# Patient Record
Sex: MALE | Race: WHITE | NOT HISPANIC OR LATINO | Employment: OTHER | URBAN - METROPOLITAN AREA
[De-identification: names, ages, dates, MRNs, and addresses within clinical notes are randomized per-mention and may not be internally consistent; named-entity substitution may affect disease eponyms.]

---

## 2018-05-01 ENCOUNTER — AMBULATORY - HEALTHEAST (OUTPATIENT)
Dept: INTERNAL MEDICINE | Facility: CLINIC | Age: 63
End: 2018-05-01

## 2018-05-01 ENCOUNTER — OFFICE VISIT - HEALTHEAST (OUTPATIENT)
Dept: INTERNAL MEDICINE | Facility: CLINIC | Age: 63
End: 2018-05-01

## 2018-05-01 DIAGNOSIS — F17.200 TOBACCO DEPENDENCE: ICD-10-CM

## 2018-05-01 DIAGNOSIS — I10 ESSENTIAL HYPERTENSION: ICD-10-CM

## 2018-05-01 DIAGNOSIS — R27.0 ATAXIA: ICD-10-CM

## 2018-05-01 DIAGNOSIS — F32.A DEPRESSION: ICD-10-CM

## 2018-05-01 DIAGNOSIS — R01.1 HEART MURMUR: ICD-10-CM

## 2018-05-01 LAB
ALBUMIN SERPL-MCNC: 4.1 G/DL (ref 3.5–5)
ALP SERPL-CCNC: 66 U/L (ref 45–120)
ALT SERPL W P-5'-P-CCNC: 15 U/L (ref 0–45)
ANION GAP SERPL CALCULATED.3IONS-SCNC: 10 MMOL/L (ref 5–18)
AST SERPL W P-5'-P-CCNC: 14 U/L (ref 0–40)
BASOPHILS # BLD AUTO: 0.1 THOU/UL (ref 0–0.2)
BASOPHILS NFR BLD AUTO: 1 % (ref 0–2)
BILIRUB SERPL-MCNC: 0.6 MG/DL (ref 0–1)
BUN SERPL-MCNC: 21 MG/DL (ref 8–22)
C REACTIVE PROTEIN LHE: 0.1 MG/DL (ref 0–0.8)
CALCIUM SERPL-MCNC: 10 MG/DL (ref 8.5–10.5)
CHLORIDE BLD-SCNC: 107 MMOL/L (ref 98–107)
CO2 SERPL-SCNC: 23 MMOL/L (ref 22–31)
CREAT SERPL-MCNC: 0.8 MG/DL (ref 0.7–1.3)
EOSINOPHIL # BLD AUTO: 0.2 THOU/UL (ref 0–0.4)
EOSINOPHIL NFR BLD AUTO: 2 % (ref 0–6)
ERYTHROCYTE [DISTWIDTH] IN BLOOD BY AUTOMATED COUNT: 11.9 % (ref 11–14.5)
GFR SERPL CREATININE-BSD FRML MDRD: >60 ML/MIN/1.73M2
GLUCOSE BLD-MCNC: 83 MG/DL (ref 70–125)
HCT VFR BLD AUTO: 45.2 % (ref 40–54)
HGB BLD-MCNC: 15.7 G/DL (ref 14–18)
LYMPHOCYTES # BLD AUTO: 1.9 THOU/UL (ref 0.8–4.4)
LYMPHOCYTES NFR BLD AUTO: 24 % (ref 20–40)
MAGNESIUM SERPL-MCNC: 2.2 MG/DL (ref 1.8–2.6)
MCH RBC QN AUTO: 31.5 PG (ref 27–34)
MCHC RBC AUTO-ENTMCNC: 34.7 G/DL (ref 32–36)
MCV RBC AUTO: 91 FL (ref 80–100)
MONOCYTES # BLD AUTO: 0.7 THOU/UL (ref 0–0.9)
MONOCYTES NFR BLD AUTO: 9 % (ref 2–10)
NEUTROPHILS # BLD AUTO: 5.1 THOU/UL (ref 2–7.7)
NEUTROPHILS NFR BLD AUTO: 65 % (ref 50–70)
PLATELET # BLD AUTO: 229 THOU/UL (ref 140–440)
PMV BLD AUTO: 7.7 FL (ref 7–10)
POTASSIUM BLD-SCNC: 4.5 MMOL/L (ref 3.5–5)
PROT SERPL-MCNC: 6.7 G/DL (ref 6–8)
RBC # BLD AUTO: 4.98 MILL/UL (ref 4.4–6.2)
SODIUM SERPL-SCNC: 140 MMOL/L (ref 136–145)
T4 FREE SERPL-MCNC: 1.1 NG/DL (ref 0.7–1.8)
TSH SERPL DL<=0.005 MIU/L-ACNC: 1.75 UIU/ML (ref 0.3–5)
VIT B12 SERPL-MCNC: 509 PG/ML (ref 213–816)
WBC: 7.9 THOU/UL (ref 4–11)

## 2018-05-01 ASSESSMENT — MIFFLIN-ST. JEOR: SCORE: 1727

## 2018-05-07 ENCOUNTER — HOSPITAL ENCOUNTER (OUTPATIENT)
Dept: CARDIOLOGY | Facility: CLINIC | Age: 63
Discharge: HOME | End: 2018-05-07
Attending: INTERNAL MEDICINE

## 2018-05-07 DIAGNOSIS — R01.1 HEART MURMUR: ICD-10-CM

## 2018-05-07 LAB
AORTIC ROOT: 4.2 CM
AORTIC VALVE MEAN VELOCITY: 225 CM/S
AV DIMENSIONLESS INDEX VTI: 0.7
AV MEAN GRADIENT: 25 MMHG
AV PEAK GRADIENT: 48.7 MMHG
AV VALVE AREA: 2.8 CM2
AV VELOCITY RATIO: 0.8
BSA FOR ECHO PROCEDURE: 2.15 M2
CV BLOOD PRESSURE: NORMAL MMHG
CV ECHO HEIGHT: 73 IN
CV ECHO WEIGHT: 198 LBS
DOP CALC AO PEAK VEL: 349 CM/S
DOP CALC AO VTI: 65.7 CM
DOP CALC LVOT AREA: 3.8 CM2
DOP CALC LVOT DIAMETER: 2.2 CM
DOP CALC LVOT PEAK VEL: 269 CM/S
DOP CALC LVOT STROKE VOLUME: 182 CM3
DOP CALCLVOT PEAK VEL VTI: 47.9 CM
EJECTION FRACTION: 63 % (ref 55–75)
FRACTIONAL SHORTENING: 37.5 % (ref 28–44)
INTERVENTRICULAR SEPTUM IN END DIASTOLE: 2.3 CM (ref 0.6–1)
IVS/PW RATIO: 1.4
LA AREA 2: 19.2 CM2
LEFT ATRIUM AREA: 19.2 CM2
LEFT ATRIUM LENGTH: 5.9 CM
LEFT VENTRICLE DIASTOLIC VOLUME INDEX: 38.6 CM3/M2 (ref 34–74)
LEFT VENTRICLE DIASTOLIC VOLUME: 83 CM3 (ref 62–150)
LEFT VENTRICLE MASS INDEX: 173.6 G/M2
LEFT VENTRICLE SYSTOLIC VOLUME INDEX: 14.4 CM3/M2 (ref 11–31)
LEFT VENTRICLE SYSTOLIC VOLUME: 31 CM3 (ref 21–61)
LEFT VENTRICULAR INTERNAL DIMENSION IN DIASTOLE: 4 CM (ref 4.2–5.8)
LEFT VENTRICULAR INTERNAL DIMENSION IN SYSTOLE: 2.5 CM (ref 2.5–4)
LEFT VENTRICULAR MASS: 373.3 G
LEFT VENTRICULAR OUTFLOW TRACT MEAN GRADIENT: 15 MMHG
LEFT VENTRICULAR OUTFLOW TRACT MEAN VELOCITY: 180 CM/S
LEFT VENTRICULAR OUTFLOW TRACT PEAK GRADIENT: 29 MMHG
LEFT VENTRICULAR POSTERIOR WALL IN END DIASTOLE: 1.7 CM (ref 0.6–1)
LV STROKE VOLUME INDEX: 84.6 ML/M2
MITRAL VALVE E/A RATIO: 0.8
MV DECELERATION TIME: 222 MS
MV E'TISSUE VEL-MED: 4.46 CM/S
MV MEDIAL E/E' RATIO: 12.5
MV PEAK A VELOCITY: 70.1 CM/S
MV PEAK E VELOCITY: 55.8 CM/S
NUC REST DIASTOLIC VOLUME INDEX: 3168 LBS
NUC REST SYSTOLIC VOLUME INDEX: 73 IN
PR MAX PG: 5 MMHG
PR PEAK VELOCITY: 109 CM/S
TRICUSPID VALVE ANULAR PLANE SYSTOLIC EXCURSION: 2 CM

## 2018-05-07 ASSESSMENT — MIFFLIN-ST. JEOR: SCORE: 1727

## 2018-05-08 ENCOUNTER — COMMUNICATION - HEALTHEAST (OUTPATIENT)
Dept: SCHEDULING | Facility: CLINIC | Age: 63
End: 2018-05-08

## 2018-05-08 ENCOUNTER — AMBULATORY - HEALTHEAST (OUTPATIENT)
Dept: INTERNAL MEDICINE | Facility: CLINIC | Age: 63
End: 2018-05-08

## 2018-05-08 ENCOUNTER — COMMUNICATION - HEALTHEAST (OUTPATIENT)
Dept: INTERNAL MEDICINE | Facility: CLINIC | Age: 63
End: 2018-05-08

## 2018-05-08 DIAGNOSIS — I42.9 CARDIOMYOPATHY (H): ICD-10-CM

## 2018-05-08 DIAGNOSIS — R27.0 ATAXIA: ICD-10-CM

## 2018-05-14 ENCOUNTER — COMMUNICATION - HEALTHEAST (OUTPATIENT)
Dept: INTERNAL MEDICINE | Facility: CLINIC | Age: 63
End: 2018-05-14

## 2018-05-14 ENCOUNTER — OFFICE VISIT - HEALTHEAST (OUTPATIENT)
Dept: INTERNAL MEDICINE | Facility: CLINIC | Age: 63
End: 2018-05-14

## 2018-05-14 DIAGNOSIS — M47.812 CERVICAL SPONDYLOSIS: ICD-10-CM

## 2018-05-14 DIAGNOSIS — I42.2 CARDIOMYOPATHY, HYPERTROPHIC (H): ICD-10-CM

## 2018-05-14 DIAGNOSIS — M47.816 LUMBAR SPONDYLOSIS: ICD-10-CM

## 2018-05-14 DIAGNOSIS — R10.9 ABDOMINAL PAIN: ICD-10-CM

## 2018-05-15 ENCOUNTER — OFFICE VISIT - HEALTHEAST (OUTPATIENT)
Dept: CARDIOLOGY | Facility: CLINIC | Age: 63
End: 2018-05-15

## 2018-05-15 ENCOUNTER — HOSPITAL ENCOUNTER (OUTPATIENT)
Dept: CT IMAGING | Facility: CLINIC | Age: 63
Discharge: HOME | End: 2018-05-15
Attending: INTERNAL MEDICINE

## 2018-05-15 DIAGNOSIS — I51.89 DYNAMIC LEFT VENTRICULAR OUTFLOW OBSTRUCTION: ICD-10-CM

## 2018-05-15 DIAGNOSIS — I10 ESSENTIAL HYPERTENSION: ICD-10-CM

## 2018-05-15 DIAGNOSIS — Z72.0 TOBACCO ABUSE: ICD-10-CM

## 2018-05-15 DIAGNOSIS — R55 SYNCOPE, UNSPECIFIED SYNCOPE TYPE: ICD-10-CM

## 2018-05-15 DIAGNOSIS — R10.9 ABDOMINAL PAIN: ICD-10-CM

## 2018-05-15 DIAGNOSIS — I42.9 CARDIOMYOPATHY (H): ICD-10-CM

## 2018-05-15 LAB
ATRIAL RATE - MUSE: 76 BPM
DIASTOLIC BLOOD PRESSURE - MUSE: NORMAL MMHG
INTERPRETATION ECG - MUSE: NORMAL
P AXIS - MUSE: 17 DEGREES
PR INTERVAL - MUSE: 142 MS
QRS DURATION - MUSE: 90 MS
QT - MUSE: 402 MS
QTC - MUSE: 452 MS
R AXIS - MUSE: 16 DEGREES
SYSTOLIC BLOOD PRESSURE - MUSE: NORMAL MMHG
T AXIS - MUSE: 94 DEGREES
VENTRICULAR RATE- MUSE: 76 BPM

## 2018-05-15 ASSESSMENT — MIFFLIN-ST. JEOR: SCORE: 1722.47

## 2018-05-17 ENCOUNTER — COMMUNICATION - HEALTHEAST (OUTPATIENT)
Dept: CARDIOLOGY | Facility: CLINIC | Age: 63
End: 2018-05-17

## 2018-05-17 DIAGNOSIS — I10 HTN (HYPERTENSION): ICD-10-CM

## 2018-05-21 ENCOUNTER — COMMUNICATION - HEALTHEAST (OUTPATIENT)
Dept: SCHEDULING | Facility: CLINIC | Age: 63
End: 2018-05-21

## 2018-05-23 ENCOUNTER — MEDICAL CORRESPONDENCE (OUTPATIENT)
Dept: HEALTH INFORMATION MANAGEMENT | Facility: CLINIC | Age: 63
End: 2018-05-23

## 2018-05-30 ENCOUNTER — OFFICE VISIT - HEALTHEAST (OUTPATIENT)
Dept: INTERNAL MEDICINE | Facility: CLINIC | Age: 63
End: 2018-05-30

## 2018-05-30 DIAGNOSIS — G31.9 CEREBELLAR DEGENERATION (H): ICD-10-CM

## 2018-05-30 DIAGNOSIS — I42.2 HYPERTROPHIC CARDIOMYOPATHY (H): ICD-10-CM

## 2018-05-30 DIAGNOSIS — I10 ESSENTIAL HYPERTENSION: ICD-10-CM

## 2018-05-30 DIAGNOSIS — R27.0 ATAXIA: ICD-10-CM

## 2018-06-05 ENCOUNTER — TRANSFERRED RECORDS (OUTPATIENT)
Dept: HEALTH INFORMATION MANAGEMENT | Facility: CLINIC | Age: 63
End: 2018-06-05

## 2018-06-05 ENCOUNTER — RECORDS - HEALTHEAST (OUTPATIENT)
Dept: ADMINISTRATIVE | Facility: OTHER | Age: 63
End: 2018-06-05

## 2018-06-06 ENCOUNTER — HOSPITAL ENCOUNTER (OUTPATIENT)
Dept: MRI IMAGING | Facility: HOSPITAL | Age: 63
Discharge: HOME | End: 2018-06-06
Attending: INTERNAL MEDICINE

## 2018-06-06 DIAGNOSIS — I51.89 DYNAMIC LEFT VENTRICULAR OUTFLOW OBSTRUCTION: ICD-10-CM

## 2018-06-06 DIAGNOSIS — I42.9 CARDIOMYOPATHY (H): ICD-10-CM

## 2018-06-06 DIAGNOSIS — R55 SYNCOPE, UNSPECIFIED SYNCOPE TYPE: ICD-10-CM

## 2018-06-06 LAB
CREAT BLD-MCNC: 0.9 MG/DL
CREAT BLD-MCNC: 0.9 MG/DL (ref 0.7–1.3)
POC GFR AMER AF HE - HISTORICAL: >60 ML/MIN/1.73M2
POC GFR NON AMER AF HE - HISTORICAL: >60 ML/MIN/1.73M2

## 2018-06-08 LAB
CCTA EJECTION FRACTION: 93 %
CCTA INTERVENTRICULAR SETPUM: 1.9 CM (ref 0.6–1.1)
CCTA LEFT INTERNAL DIMENSION IN SYSTOLE: 2 CM (ref 2.1–4)
CCTA LEFT VENTRICULAR INTERNAL DIMENSION IN DIASTOLE: 4.4 CM (ref 3.5–6)
CCTA LEFT VENTRICULAR MASS: 294.95 G
CCTA POSTERIOR WALL: 1.3 CM (ref 0.6–1.1)
MR CARDIAC LEFT VENTRIAL CARDIAC INDEX: 3.2 L/MIN/M2 (ref 1.7–4.2)
MR CARDIAC LEFT VENTRICAL CARDIAC OUTPUT: 6.9 L/MIN (ref 2.8–8.8)
MR CARDIAC LEFT VENTRICULAR DIASTOLIC VOLUME INDEX: 76.09 ML/M2 (ref 47–92)
MR CARDIAC LEFT VENTRICULAR MASS INDEX: 128.22 G/M2 (ref 70–113)
MR CARDIAC LEFT VENTRICULAR MASS: 273 G (ref 118–238)
MR CARDIAC LEFT VENTRICULAR STROKE VOLUME INDEX: 62.94 ML/M2 (ref 32–62)
MR CARDIAC LEFT VENTRICULAR SYSTOLIC VOLUME INDEX: 13.15 ML/M2 (ref 13–30)
MR EJECTION FRACTION: 82.72 %
MR HEIGHT: 1.85 M
MR LEFT VENTRICULAR DYSTOLIC VOLUMEN: 162 ML (ref 77–195)
MR LEFT VENTRICULAR STROKE VOLUMEN: 134 ML (ref 51–133)
MR LEFT VENTRICULAR SYSTOLIC VOLUME: 28 ML (ref 19–72)
MR WEIGHT: 88.5 KG

## 2018-06-18 ENCOUNTER — HOSPITAL ENCOUNTER (OUTPATIENT)
Dept: PHYSICAL THERAPY | Facility: CLINIC | Age: 63
Setting detail: THERAPIES SERIES
End: 2018-06-18
Attending: INTERNAL MEDICINE
Payer: COMMERCIAL

## 2018-06-18 PROCEDURE — 97162 PT EVAL MOD COMPLEX 30 MIN: CPT | Mod: GP | Performed by: PHYSICAL THERAPIST

## 2018-06-18 PROCEDURE — 40000719 ZZHC STATISTIC PT DEPARTMENT NEURO VISIT: Performed by: PHYSICAL THERAPIST

## 2018-06-18 PROCEDURE — 97112 NEUROMUSCULAR REEDUCATION: CPT | Mod: GP | Performed by: PHYSICAL THERAPIST

## 2018-06-18 PROCEDURE — 97116 GAIT TRAINING THERAPY: CPT | Mod: GP | Performed by: PHYSICAL THERAPIST

## 2018-06-18 ASSESSMENT — 6 MINUTE WALK TEST (6MWT): TOTAL DISTANCE WALKED (FT): 1100

## 2018-06-18 NOTE — PROGRESS NOTES
06/18/18 1300   Quick Adds   Type of Visit Initial OP PT Evaluation   General Information   Start of Care Date 06/18/18   Referring Physician Dr. Carroll Servin   Orders Evaluate and Treat as Indicated   Additional Orders Gait and balance evaluation regarding ataxia   Order Date 05/23/18   Medical Diagnosis Ataxia   Onset of illness/injury or Date of Surgery 05/23/18   Precautions/Limitations fall precautions   Surgical/Medical history reviewed Yes   Pertinent history of current problem (include personal factors and/or comorbidities that impact the POC) Pt reports he's a recovering alcoholic and has been sober for the past 6 weeks. Started to develop ataxia and difficulties with gait, tripping and falling or nearly falling. Has baseline tinnitis for past 3--4 years and also headaches and 'gut aches' that's he hopeful will improve with his sobriety.   Pertinent Visual History  wears readers   Prior level of function comment independent, more cautious and limiting social activites due to gait disturbances   Current Community Support Family/friend caregiver   Patient role/Employment history Retired  ()   [facility]   Home/Community Accessibility Comments flight of steps down to lower level where basement is located, doesn't carry objects while negotiating stairs.   Current Assistive Devices Standard Cane   Assistive Devices Comments starting using SPC 3 weeks ago   Patient/Family Goals Statement Goal is to be on 'Dancing with the Stars'   Fall Risk Screen   Fall screen completed by PT   Have you fallen 2 or more times in the past year? Yes   Have you fallen and had an injury in the past year? No   Timed Up and Go score (seconds) 13.2s   Is patient a fall risk? Yes   Pain   Patient currently in pain No   Cognitive Status Examination   Orientation orientation to person, place and time   Level of Consciousness alert   Follows Commands and Answers Questions 100% of the time    Personal Safety and Judgment intact   Memory intact   Strength   Strength Comments Hamstring and hip abductors 4-/5, left ankle DF 4/5.   Bed Mobility   Bed Mobility Comments independent   Transfer Skills   Transfer Comments able to rise without UE assist   Gait   Gait Comments ambulates with SPC, increased knee flexion bilaterally to assist with foot clearance, left foot lands in increased supination during stance phase, decreased trunk rotation, increased DF bilatreally to assist with foot clearance. Ambulation appears like sensation is improved with over compensation to clear feet.   Gait Special Tests Six Minute Walk Test   Feet 1100 Feet   Comments carries cane; see gait analysis   Balance Special Tests Modified CTSIB Conditions   Condition 1, seconds 30 Seconds   Condition 2, seconds 8 Seconds   Condition 4, seconds 15 Seconds   Condition 5, seconds 2 Seconds   Balance Special Tests Sit to Stand Reps in 30 Seconds   Reps in 30 seconds 11   Height 16   Comments without UE assist   Sensory Examination   Sensory Perception Comments a band of tingling around metatarsal heads bilaterally, stays on dorsum of foot. Pt had EMG study but not able to see results.   Coordination   Coordination Comments impaired, single foot taps, alternating L/R and left side signifcantly delayed compared to right. Unable to sync hand tap and foot tap. Heel to shin is abnormal   Planned Therapy Interventions   Planned Therapy Interventions balance training;gait training;neuromuscular re-education;strengthening;transfer training   Clinical Impression   Criteria for Skilled Therapeutic Interventions Met yes, treatment indicated   PT Diagnosis difficutly with gait   Influenced by the following impairments tripping/falling/near misses, fall history, ataxia and coordination impairment, history of ETOH abuse.   Functional limitations due to impairments difficulty with gait, transfers, fall risk, unsteady with first steps, difficulty with  transitional movements and positions   Clinical Presentation Evolving/Changing   Clinical Presentation Rationale fall history/risk   Clinical Decision Making (Complexity) Moderate complexity   Therapy Frequency 2 times/Week   Predicted Duration of Therapy Intervention (days/wks) 60 days   Risk & Benefits of therapy have been explained Yes   Patient, Family & other staff in agreement with plan of care Yes   Education Assessment   Preferred Learning Style Demonstration   Barriers to Learning No barriers   GOALS   PT Eval Goals 1;2;3   Goal 1   Goal Identifier 6MWT   Goal Description 1. Patient will improve distance ambulated on the 6MWT by greater than 150 feet to indicate improved gait mechanics and safety when turning.   Target Date 08/17/18   Goal 2   Goal Identifier BALANCE   Goal Description 2. Pt will improve static balance with eyes closed to improve safety when walking at night, in low light, or balance in the shower by improving conditions 2,4 and the modified CTSIB   Target Date 08/17/18   Goal 3   Goal Identifier GAIT MECHANICS   Goal Description 3. Pt will ambulate with equal step length and arm swing without an assistive device in order to safely negotiate all community surfaces   Target Date 08/17/18   Total Evaluation Time   Total Evaluation Time (Minutes) 20

## 2018-06-19 ENCOUNTER — OFFICE VISIT - HEALTHEAST (OUTPATIENT)
Dept: CARDIOLOGY | Facility: CLINIC | Age: 63
End: 2018-06-19

## 2018-06-19 DIAGNOSIS — Z72.0 TOBACCO ABUSE: ICD-10-CM

## 2018-06-19 DIAGNOSIS — I10 ESSENTIAL HYPERTENSION: ICD-10-CM

## 2018-06-19 DIAGNOSIS — I42.2 HYPERTROPHIC CARDIOMYOPATHY (H): ICD-10-CM

## 2018-06-19 ASSESSMENT — MIFFLIN-ST. JEOR: SCORE: 1699.79

## 2018-06-23 ENCOUNTER — RECORDS - HEALTHEAST (OUTPATIENT)
Dept: ADMINISTRATIVE | Facility: OTHER | Age: 63
End: 2018-06-23

## 2018-06-25 ENCOUNTER — HOSPITAL ENCOUNTER (OUTPATIENT)
Dept: PHYSICAL THERAPY | Facility: CLINIC | Age: 63
Setting detail: THERAPIES SERIES
End: 2018-06-25
Attending: PSYCHIATRY & NEUROLOGY
Payer: COMMERCIAL

## 2018-06-25 PROCEDURE — 40000719 ZZHC STATISTIC PT DEPARTMENT NEURO VISIT: Performed by: PHYSICAL THERAPIST

## 2018-06-25 PROCEDURE — 97112 NEUROMUSCULAR REEDUCATION: CPT | Mod: GP | Performed by: PHYSICAL THERAPIST

## 2018-06-25 PROCEDURE — 97116 GAIT TRAINING THERAPY: CPT | Mod: GP | Performed by: PHYSICAL THERAPIST

## 2018-06-27 ENCOUNTER — COMMUNICATION - HEALTHEAST (OUTPATIENT)
Dept: INTERNAL MEDICINE | Facility: CLINIC | Age: 63
End: 2018-06-27

## 2018-06-28 ENCOUNTER — HOSPITAL ENCOUNTER (OUTPATIENT)
Dept: PHYSICAL THERAPY | Facility: CLINIC | Age: 63
Setting detail: THERAPIES SERIES
End: 2018-06-28
Attending: PSYCHIATRY & NEUROLOGY
Payer: COMMERCIAL

## 2018-06-28 PROCEDURE — 40000719 ZZHC STATISTIC PT DEPARTMENT NEURO VISIT: Performed by: PHYSICAL THERAPIST

## 2018-06-28 PROCEDURE — 97116 GAIT TRAINING THERAPY: CPT | Mod: GP | Performed by: PHYSICAL THERAPIST

## 2018-06-28 PROCEDURE — 97112 NEUROMUSCULAR REEDUCATION: CPT | Mod: GP | Performed by: PHYSICAL THERAPIST

## 2018-07-05 ENCOUNTER — HOSPITAL ENCOUNTER (OUTPATIENT)
Dept: PHYSICAL THERAPY | Facility: CLINIC | Age: 63
Setting detail: THERAPIES SERIES
End: 2018-07-05
Attending: PSYCHIATRY & NEUROLOGY
Payer: COMMERCIAL

## 2018-07-05 PROCEDURE — 97112 NEUROMUSCULAR REEDUCATION: CPT | Mod: GP | Performed by: PHYSICAL THERAPIST

## 2018-07-05 PROCEDURE — 40000719 ZZHC STATISTIC PT DEPARTMENT NEURO VISIT: Performed by: PHYSICAL THERAPIST

## 2018-07-05 PROCEDURE — 97116 GAIT TRAINING THERAPY: CPT | Mod: GP | Performed by: PHYSICAL THERAPIST

## 2018-07-12 ENCOUNTER — HOSPITAL ENCOUNTER (OUTPATIENT)
Dept: PHYSICAL THERAPY | Facility: CLINIC | Age: 63
Setting detail: THERAPIES SERIES
End: 2018-07-12
Attending: PSYCHIATRY & NEUROLOGY
Payer: COMMERCIAL

## 2018-07-12 PROCEDURE — 40000719 ZZHC STATISTIC PT DEPARTMENT NEURO VISIT: Performed by: PHYSICAL THERAPIST

## 2018-07-12 PROCEDURE — 97112 NEUROMUSCULAR REEDUCATION: CPT | Mod: GP | Performed by: PHYSICAL THERAPIST

## 2018-07-12 PROCEDURE — 97110 THERAPEUTIC EXERCISES: CPT | Mod: GP | Performed by: PHYSICAL THERAPIST

## 2018-07-12 PROCEDURE — 97116 GAIT TRAINING THERAPY: CPT | Mod: GP | Performed by: PHYSICAL THERAPIST

## 2018-07-16 ENCOUNTER — HOSPITAL ENCOUNTER (OUTPATIENT)
Dept: PHYSICAL THERAPY | Facility: CLINIC | Age: 63
Setting detail: THERAPIES SERIES
End: 2018-07-16
Attending: PSYCHIATRY & NEUROLOGY
Payer: COMMERCIAL

## 2018-07-16 PROCEDURE — 97110 THERAPEUTIC EXERCISES: CPT | Mod: GP | Performed by: PHYSICAL THERAPIST

## 2018-07-16 PROCEDURE — 40000719 ZZHC STATISTIC PT DEPARTMENT NEURO VISIT: Performed by: PHYSICAL THERAPIST

## 2018-07-16 PROCEDURE — 97116 GAIT TRAINING THERAPY: CPT | Mod: GP | Performed by: PHYSICAL THERAPIST

## 2018-07-16 PROCEDURE — 97112 NEUROMUSCULAR REEDUCATION: CPT | Mod: GP | Performed by: PHYSICAL THERAPIST

## 2018-07-17 ENCOUNTER — RECORDS - HEALTHEAST (OUTPATIENT)
Dept: ADMINISTRATIVE | Facility: OTHER | Age: 63
End: 2018-07-17

## 2018-07-17 ENCOUNTER — TRANSFERRED RECORDS (OUTPATIENT)
Dept: HEALTH INFORMATION MANAGEMENT | Facility: CLINIC | Age: 63
End: 2018-07-17

## 2018-07-19 ENCOUNTER — HOSPITAL ENCOUNTER (OUTPATIENT)
Dept: PHYSICAL THERAPY | Facility: CLINIC | Age: 63
Setting detail: THERAPIES SERIES
End: 2018-07-19
Attending: PSYCHIATRY & NEUROLOGY
Payer: COMMERCIAL

## 2018-07-19 PROCEDURE — 97112 NEUROMUSCULAR REEDUCATION: CPT | Mod: GP | Performed by: PHYSICAL THERAPIST

## 2018-07-19 PROCEDURE — 40000719 ZZHC STATISTIC PT DEPARTMENT NEURO VISIT: Performed by: PHYSICAL THERAPIST

## 2018-07-19 PROCEDURE — 97116 GAIT TRAINING THERAPY: CPT | Mod: GP | Performed by: PHYSICAL THERAPIST

## 2018-07-20 ENCOUNTER — TRANSFERRED RECORDS (OUTPATIENT)
Dept: HEALTH INFORMATION MANAGEMENT | Facility: CLINIC | Age: 63
End: 2018-07-20

## 2018-07-20 ENCOUNTER — RECORDS - HEALTHEAST (OUTPATIENT)
Dept: ADMINISTRATIVE | Facility: OTHER | Age: 63
End: 2018-07-20

## 2018-07-26 ENCOUNTER — HOSPITAL ENCOUNTER (OUTPATIENT)
Dept: PHYSICAL THERAPY | Facility: CLINIC | Age: 63
Setting detail: THERAPIES SERIES
End: 2018-07-26
Attending: PSYCHIATRY & NEUROLOGY
Payer: COMMERCIAL

## 2018-07-26 PROCEDURE — 97112 NEUROMUSCULAR REEDUCATION: CPT | Mod: GP | Performed by: PHYSICAL THERAPIST

## 2018-07-26 PROCEDURE — 97116 GAIT TRAINING THERAPY: CPT | Mod: GP | Performed by: PHYSICAL THERAPIST

## 2018-07-26 PROCEDURE — 40000719 ZZHC STATISTIC PT DEPARTMENT NEURO VISIT: Performed by: PHYSICAL THERAPIST

## 2018-08-02 ENCOUNTER — COMMUNICATION - HEALTHEAST (OUTPATIENT)
Dept: INTERNAL MEDICINE | Facility: CLINIC | Age: 63
End: 2018-08-02

## 2018-08-02 ENCOUNTER — HOSPITAL ENCOUNTER (OUTPATIENT)
Dept: PHYSICAL THERAPY | Facility: CLINIC | Age: 63
Setting detail: THERAPIES SERIES
End: 2018-08-02
Attending: PSYCHIATRY & NEUROLOGY
Payer: COMMERCIAL

## 2018-08-02 DIAGNOSIS — F32.A DEPRESSION: ICD-10-CM

## 2018-08-02 PROCEDURE — 40000719 ZZHC STATISTIC PT DEPARTMENT NEURO VISIT: Performed by: PHYSICAL THERAPIST

## 2018-08-02 PROCEDURE — 97112 NEUROMUSCULAR REEDUCATION: CPT | Mod: GP | Performed by: PHYSICAL THERAPIST

## 2018-08-16 ENCOUNTER — HOSPITAL ENCOUNTER (OUTPATIENT)
Dept: PHYSICAL THERAPY | Facility: CLINIC | Age: 63
Setting detail: THERAPIES SERIES
End: 2018-08-16
Attending: PSYCHIATRY & NEUROLOGY
Payer: COMMERCIAL

## 2018-08-16 PROCEDURE — 40000719 ZZHC STATISTIC PT DEPARTMENT NEURO VISIT: Performed by: PHYSICAL THERAPIST

## 2018-08-16 PROCEDURE — 97112 NEUROMUSCULAR REEDUCATION: CPT | Mod: GP | Performed by: PHYSICAL THERAPIST

## 2018-08-16 PROCEDURE — 97116 GAIT TRAINING THERAPY: CPT | Mod: GP | Performed by: PHYSICAL THERAPIST

## 2018-08-16 PROCEDURE — 97110 THERAPEUTIC EXERCISES: CPT | Mod: GP | Performed by: PHYSICAL THERAPIST

## 2018-09-05 ENCOUNTER — COMMUNICATION - HEALTHEAST (OUTPATIENT)
Dept: INTERNAL MEDICINE | Facility: CLINIC | Age: 63
End: 2018-09-05

## 2018-09-12 ENCOUNTER — OFFICE VISIT - HEALTHEAST (OUTPATIENT)
Dept: INTERNAL MEDICINE | Facility: CLINIC | Age: 63
End: 2018-09-12

## 2018-09-12 DIAGNOSIS — I10 ESSENTIAL HYPERTENSION: ICD-10-CM

## 2018-09-12 DIAGNOSIS — F32.A DEPRESSION, UNSPECIFIED DEPRESSION TYPE: ICD-10-CM

## 2018-09-12 DIAGNOSIS — Z12.5 SCREENING FOR PROSTATE CANCER: ICD-10-CM

## 2018-09-12 DIAGNOSIS — I42.2 HYPERTROPHIC CARDIOMYOPATHY (H): ICD-10-CM

## 2018-09-12 DIAGNOSIS — G31.9 CEREBELLAR DEGENERATION (H): ICD-10-CM

## 2018-09-12 LAB — PSA SERPL-MCNC: 1.3 NG/ML (ref 0–4.5)

## 2018-09-12 ASSESSMENT — MIFFLIN-ST. JEOR: SCORE: 1755.58

## 2018-09-14 ENCOUNTER — COMMUNICATION - HEALTHEAST (OUTPATIENT)
Dept: INTERNAL MEDICINE | Facility: CLINIC | Age: 63
End: 2018-09-14

## 2018-09-18 ENCOUNTER — COMMUNICATION - HEALTHEAST (OUTPATIENT)
Dept: INTERNAL MEDICINE | Facility: CLINIC | Age: 63
End: 2018-09-18

## 2018-09-18 DIAGNOSIS — Z23 NEED FOR SHINGLES VACCINE: ICD-10-CM

## 2019-01-22 ENCOUNTER — RECORDS - HEALTHEAST (OUTPATIENT)
Dept: ADMINISTRATIVE | Facility: OTHER | Age: 64
End: 2019-01-22

## 2019-05-08 ENCOUNTER — COMMUNICATION - HEALTHEAST (OUTPATIENT)
Dept: ADMINISTRATIVE | Facility: CLINIC | Age: 64
End: 2019-05-08

## 2019-06-11 ENCOUNTER — COMMUNICATION - HEALTHEAST (OUTPATIENT)
Dept: ADMINISTRATIVE | Facility: CLINIC | Age: 64
End: 2019-06-11

## 2019-06-17 ENCOUNTER — AMBULATORY - HEALTHEAST (OUTPATIENT)
Dept: CARDIOLOGY | Facility: CLINIC | Age: 64
End: 2019-06-17

## 2019-06-17 DIAGNOSIS — I42.2 HYPERTROPHIC CARDIOMYOPATHY (H): ICD-10-CM

## 2019-06-24 ENCOUNTER — HOSPITAL ENCOUNTER (OUTPATIENT)
Dept: CARDIOLOGY | Facility: CLINIC | Age: 64
Discharge: HOME | End: 2019-06-24
Attending: INTERNAL MEDICINE

## 2019-06-24 DIAGNOSIS — I42.2 HYPERTROPHIC CARDIOMYOPATHY (H): ICD-10-CM

## 2019-06-24 LAB
AORTIC ROOT: 4.4 CM
AORTIC VALVE MEAN VELOCITY: 243 CM/S
AR DECEL SLOPE: 1890 MM/S2
AR PEAK VELOCITY: 307 CM/S
ASCENDING AORTA: 4.6 CM
AV DIMENSIONLESS INDEX VTI: 0.7
AV MEAN GRADIENT: 29 MMHG
AV PEAK GRADIENT: 50.7 MMHG
AV REGURGITANT PEAK GRADIENT: 37.7 MMHG
AV REGURGITATION PRESSURE HALF TIME: 486 MS
AV VALVE AREA: 3 CM2
AV VELOCITY RATIO: 0.7
BSA FOR ECHO PROCEDURE: 2.18 M2
CV BLOOD PRESSURE: ABNORMAL MMHG
CV ECHO HEIGHT: 73 IN
CV ECHO WEIGHT: 204 LBS
DOP CALC AO PEAK VEL: 356 CM/S
DOP CALC AO VTI: 82.1 CM
DOP CALC LVOT AREA: 4.52 CM2
DOP CALC LVOT DIAMETER: 2.4 CM
DOP CALC LVOT PEAK VEL: 242 CM/S
DOP CALC LVOT STROKE VOLUME: 248.7 CM3
DOP CALCLVOT PEAK VEL VTI: 55 CM
EJECTION FRACTION: 66 % (ref 55–75)
INTERVENTRICULAR SEPTUM IN END DIASTOLE: 1.9 CM (ref 0.6–1)
IVS/PW RATIO: 1.9
LA AREA 1: 31.1 CM2
LA AREA 2: 31.2 CM2
LEFT ATRIUM LENGTH: 6.97 CM
LEFT ATRIUM VOLUME INDEX: 54.3 ML/M2
LEFT ATRIUM VOLUME: 118.3 ML
LEFT VENTRICLE CARDIAC INDEX: 6 L/MIN/M2
LEFT VENTRICLE CARDIAC OUTPUT: 13.2 L/MIN
LEFT VENTRICLE DIASTOLIC VOLUME INDEX: 28.4 CM3/M2 (ref 34–74)
LEFT VENTRICLE DIASTOLIC VOLUME: 62 CM3 (ref 62–150)
LEFT VENTRICLE HEART RATE: 53 BPM
LEFT VENTRICLE MASS INDEX: 153.9 G/M2
LEFT VENTRICLE SYSTOLIC VOLUME INDEX: 9.6 CM3/M2 (ref 11–31)
LEFT VENTRICLE SYSTOLIC VOLUME: 21 CM3 (ref 21–61)
LEFT VENTRICULAR INTERNAL DIMENSION IN DIASTOLE: 5.3 CM (ref 4.2–5.8)
LEFT VENTRICULAR MASS: 335.5 G
LEFT VENTRICULAR OUTFLOW TRACT MEAN GRADIENT: 13 MMHG
LEFT VENTRICULAR OUTFLOW TRACT MEAN VELOCITY: 165 CM/S
LEFT VENTRICULAR OUTFLOW TRACT PEAK GRADIENT: 23 MMHG
LEFT VENTRICULAR POSTERIOR WALL IN END DIASTOLE: 1 CM (ref 0.6–1)
LV STROKE VOLUME INDEX: 114.1 ML/M2
MITRAL REGURGITANT VELOCITY TIME INTEGRAL: 180 CM
MITRAL VALVE E/A RATIO: 0.7
MR MEAN GRADIENT: 148 MMHG
MR MEAN VELOCITY: 558 CM/S
MR PEAK GRADIENT: 239.6 MMHG
MV AVERAGE E/E' RATIO: 10.6 CM/S
MV DECELERATION TIME: 363 MS
MV E'TISSUE VEL-LAT: 6.82 CM/S
MV E'TISSUE VEL-MED: 5.46 CM/S
MV LATERAL E/E' RATIO: 9.5
MV MEDIAL E/E' RATIO: 11.9
MV PEAK A VELOCITY: 94.6 CM/S
MV PEAK E VELOCITY: 65.1 CM/S
NUC REST DIASTOLIC VOLUME INDEX: 3264 LBS
NUC REST SYSTOLIC VOLUME INDEX: 73 IN
PISA MR PEAK VEL: 774 CM/S
TRICUSPID VALVE ANULAR PLANE SYSTOLIC EXCURSION: 1.3 CM

## 2019-06-24 ASSESSMENT — MIFFLIN-ST. JEOR: SCORE: 1754.22

## 2019-06-25 ENCOUNTER — AMBULATORY - HEALTHEAST (OUTPATIENT)
Dept: CARDIOLOGY | Facility: CLINIC | Age: 64
End: 2019-06-25

## 2019-06-25 ENCOUNTER — COMMUNICATION - HEALTHEAST (OUTPATIENT)
Dept: CARDIOLOGY | Facility: CLINIC | Age: 64
End: 2019-06-25

## 2019-06-25 DIAGNOSIS — I10 ESSENTIAL HYPERTENSION: ICD-10-CM

## 2019-07-30 ENCOUNTER — COMMUNICATION - HEALTHEAST (OUTPATIENT)
Dept: INTERNAL MEDICINE | Facility: CLINIC | Age: 64
End: 2019-07-30

## 2019-07-30 DIAGNOSIS — F32.A DEPRESSION: ICD-10-CM

## 2019-08-12 ENCOUNTER — COMMUNICATION - HEALTHEAST (OUTPATIENT)
Dept: CARDIOLOGY | Facility: CLINIC | Age: 64
End: 2019-08-12

## 2019-08-12 DIAGNOSIS — I10 ESSENTIAL HYPERTENSION: ICD-10-CM

## 2019-09-24 ENCOUNTER — COMMUNICATION - HEALTHEAST (OUTPATIENT)
Dept: INTERNAL MEDICINE | Facility: CLINIC | Age: 64
End: 2019-09-24

## 2019-09-24 DIAGNOSIS — I10 HYPERTENSION, UNSPECIFIED TYPE: ICD-10-CM

## 2019-09-25 ENCOUNTER — COMMUNICATION - HEALTHEAST (OUTPATIENT)
Dept: CARDIOLOGY | Facility: CLINIC | Age: 64
End: 2019-09-25

## 2019-09-25 DIAGNOSIS — I10 ESSENTIAL HYPERTENSION: ICD-10-CM

## 2019-10-28 ENCOUNTER — COMMUNICATION - HEALTHEAST (OUTPATIENT)
Dept: INTERNAL MEDICINE | Facility: CLINIC | Age: 64
End: 2019-10-28

## 2019-10-28 DIAGNOSIS — F32.A DEPRESSION: ICD-10-CM

## 2019-11-19 ENCOUNTER — COMMUNICATION - HEALTHEAST (OUTPATIENT)
Dept: CARDIOLOGY | Facility: CLINIC | Age: 64
End: 2019-11-19

## 2019-11-19 DIAGNOSIS — I10 ESSENTIAL HYPERTENSION: ICD-10-CM

## 2020-01-01 ENCOUNTER — COMMUNICATION - HEALTHEAST (OUTPATIENT)
Dept: CARDIOLOGY | Facility: CLINIC | Age: 65
End: 2020-01-01

## 2020-01-01 DIAGNOSIS — I10 ESSENTIAL HYPERTENSION: ICD-10-CM

## 2020-01-06 ENCOUNTER — COMMUNICATION - HEALTHEAST (OUTPATIENT)
Dept: CARDIOLOGY | Facility: CLINIC | Age: 65
End: 2020-01-06

## 2020-01-06 DIAGNOSIS — I10 ESSENTIAL HYPERTENSION: ICD-10-CM

## 2020-04-06 ENCOUNTER — COMMUNICATION - HEALTHEAST (OUTPATIENT)
Dept: INTERNAL MEDICINE | Facility: CLINIC | Age: 65
End: 2020-04-06

## 2020-04-06 ENCOUNTER — COMMUNICATION - HEALTHEAST (OUTPATIENT)
Dept: CARDIOLOGY | Facility: CLINIC | Age: 65
End: 2020-04-06

## 2020-04-06 DIAGNOSIS — I10 ESSENTIAL HYPERTENSION: ICD-10-CM

## 2020-09-18 ENCOUNTER — COMMUNICATION - HEALTHEAST (OUTPATIENT)
Dept: INTERNAL MEDICINE | Facility: CLINIC | Age: 65
End: 2020-09-18

## 2020-09-18 DIAGNOSIS — I10 HYPERTENSION, UNSPECIFIED TYPE: ICD-10-CM

## 2020-09-23 ENCOUNTER — COMMUNICATION - HEALTHEAST (OUTPATIENT)
Dept: CARDIOLOGY | Facility: CLINIC | Age: 65
End: 2020-09-23

## 2020-09-23 ENCOUNTER — COMMUNICATION - HEALTHEAST (OUTPATIENT)
Dept: INTERNAL MEDICINE | Facility: CLINIC | Age: 65
End: 2020-09-23

## 2020-09-23 DIAGNOSIS — F32.A DEPRESSION: ICD-10-CM

## 2020-09-23 DIAGNOSIS — I10 ESSENTIAL HYPERTENSION: ICD-10-CM

## 2021-01-11 ENCOUNTER — AMBULATORY - HEALTHEAST (OUTPATIENT)
Dept: INTERNAL MEDICINE | Facility: CLINIC | Age: 66
End: 2021-01-11

## 2021-01-11 ENCOUNTER — COMMUNICATION - HEALTHEAST (OUTPATIENT)
Dept: INTERNAL MEDICINE | Facility: CLINIC | Age: 66
End: 2021-01-11

## 2021-01-11 DIAGNOSIS — I10 ESSENTIAL HYPERTENSION: ICD-10-CM

## 2021-01-13 ENCOUNTER — OFFICE VISIT - HEALTHEAST (OUTPATIENT)
Dept: INTERNAL MEDICINE | Facility: CLINIC | Age: 66
End: 2021-01-13

## 2021-01-13 DIAGNOSIS — I42.2 HYPERTROPHIC CARDIOMYOPATHY (H): ICD-10-CM

## 2021-01-13 DIAGNOSIS — G31.9 CEREBELLAR DEGENERATION (H): ICD-10-CM

## 2021-01-13 DIAGNOSIS — F32.A DEPRESSION, UNSPECIFIED DEPRESSION TYPE: ICD-10-CM

## 2021-01-13 DIAGNOSIS — I10 ESSENTIAL HYPERTENSION: ICD-10-CM

## 2021-01-13 ASSESSMENT — PATIENT HEALTH QUESTIONNAIRE - PHQ9: SUM OF ALL RESPONSES TO PHQ QUESTIONS 1-9: 0

## 2021-03-06 ENCOUNTER — COMMUNICATION - HEALTHEAST (OUTPATIENT)
Dept: SCHEDULING | Facility: CLINIC | Age: 66
End: 2021-03-06

## 2021-05-27 ASSESSMENT — PATIENT HEALTH QUESTIONNAIRE - PHQ9: SUM OF ALL RESPONSES TO PHQ QUESTIONS 1-9: 0

## 2021-05-29 ENCOUNTER — RECORDS - HEALTHEAST (OUTPATIENT)
Dept: ADMINISTRATIVE | Facility: CLINIC | Age: 66
End: 2021-05-29

## 2021-05-30 NOTE — TELEPHONE ENCOUNTER
Refill Approved    Rx renewed per Medication Renewal Policy. Medication was last renewed on 8/2/19.    Malika Ennis, [facility] Triage/Med Refill 7/30/2019     Requested Prescriptions   Pending Prescriptions Disp Refills     escitalopram oxalate (LEXAPRO) 20 MG tablet [Pharmacy Med Name: ESCITALOPRAM 20MG TABLETS] 90 tablet 0     Sig: TAKE 1 TABLET BY MOUTH ONCE DAILY       [facility] Refill Protocol  Passed - 7/30/2019  9:56 AM        Passed - PCP or prescribing provider visit in last year     Last office visit with prescriber/PCP: 9/12/2018 Mason Montero MD OR same dept: 9/12/2018 Mason Montero MD OR same specialty: 9/12/2018 Mason Montero MD  Last physical: Visit date not found Last MTM visit: Visit date not found   Next visit within 3 mo: Visit date not found  Next physical within 3 mo: Visit date not found  Prescriber OR PCP: Mason Montero MD  Last diagnosis associated with med order: 1. Depression  - escitalopram oxalate (LEXAPRO) 20 MG tablet [Pharmacy Med Name: ESCITALOPRAM 20MG TABLETS]; TAKE 1 TABLET BY MOUTH ONCE DAILY  Dispense: 90 tablet; Refill: 0    If protocol passes may refill for 12 months if within 3 months of last provider visit (or a total of 15 months).

## 2021-06-01 VITALS — HEIGHT: 73 IN | WEIGHT: 197 LBS | BODY MASS INDEX: 26.11 KG/M2

## 2021-06-01 VITALS — WEIGHT: 198 LBS | HEIGHT: 73 IN | BODY MASS INDEX: 26.24 KG/M2

## 2021-06-01 VITALS — WEIGHT: 198 LBS | BODY MASS INDEX: 26.24 KG/M2 | HEIGHT: 73 IN

## 2021-06-01 VITALS — HEIGHT: 73 IN | WEIGHT: 192 LBS | BODY MASS INDEX: 25.45 KG/M2

## 2021-06-01 VITALS — BODY MASS INDEX: 25.73 KG/M2 | WEIGHT: 195 LBS

## 2021-06-01 VITALS — WEIGHT: 194.8 LBS | BODY MASS INDEX: 25.7 KG/M2

## 2021-06-01 NOTE — TELEPHONE ENCOUNTER
RN cannot approve Refill Request    RN can NOT refill this medication Protocol failed and NO refill given      Malika Ennis, [facility] Triage/Med Refill 9/25/2019    Requested Prescriptions   Pending Prescriptions Disp Refills     lisinopril (PRINIVIL,ZESTRIL) 10 MG tablet [Pharmacy Med Name: LISINOPRIL 10MG TABLETS] 90 tablet 3     Sig: TAKE 1 TABLET BY MOUTH ONCE DAILY       Ace Inhibitors Refill Protocol Failed - 9/24/2019  7:49 AM        Failed - PCP or prescribing provider visit in past 12 months       Last office visit with prescriber/PCP: 9/12/2018 Mason Montero MD OR same dept: Visit date not found OR same specialty: 9/12/2018 Mason Montero MD  Last physical: Visit date not found Last MTM visit: Visit date not found   Next visit within 3 mo: Visit date not found  Next physical within 3 mo: Visit date not found  Prescriber OR PCP: Mason Montero MD  Last diagnosis associated with med order: There are no diagnoses linked to this encounter.  If protocol passes may refill for 12 months if within 3 months of last provider visit (or a total of 15 months).             Failed - Serum Potassium in past 12 months     No results found for: LN-POTASSIUM          Failed - Serum Creatinine in past 12 months     Creatinine   Date Value Ref Range Status   05/01/2018 0.80 0.70 - 1.30 mg/dL Final             Passed - Blood pressure filed in past 12 months     BP Readings from Last 1 Encounters:   06/24/19 134/78

## 2021-06-02 VITALS — WEIGHT: 204.3 LBS | HEIGHT: 73 IN | BODY MASS INDEX: 27.08 KG/M2

## 2021-06-02 NOTE — TELEPHONE ENCOUNTER
RN cannot approve Refill Request    RN can NOT refill this medication Protocol failed and NO refill given.     Malika Ennis, [facility] Triage/Med Refill 10/28/2019    Requested Prescriptions   Pending Prescriptions Disp Refills     escitalopram oxalate (LEXAPRO) 20 MG tablet [Pharmacy Med Name: ESCITALOPRAM 20MG TABLETS] 90 tablet 3     Sig: TAKE 1 TABLET BY MOUTH ONCE DAILY       [facility] Refill Protocol  Failed - 10/28/2019  8:00 AM        Failed - PCP or prescribing provider visit in last year     Last office visit with prescriber/PCP: 9/12/2018 Mason Montero MD OR same dept: Visit date not found OR same specialty: 9/12/2018 Mason Montero MD  Last physical: Visit date not found Last MTM visit: Visit date not found   Next visit within 3 mo: Visit date not found  Next physical within 3 mo: Visit date not found  Prescriber OR PCP: Mason Montero MD  Last diagnosis associated with med order: 1. Depression  - escitalopram oxalate (LEXAPRO) 20 MG tablet [Pharmacy Med Name: ESCITALOPRAM 20MG TABLETS]; TAKE 1 TABLET BY MOUTH ONCE DAILY  Dispense: 90 tablet; Refill: 0    If protocol passes may refill for 12 months if within 3 months of last provider visit (or a total of 15 months).

## 2021-06-03 VITALS — HEIGHT: 73 IN | BODY MASS INDEX: 27.04 KG/M2 | WEIGHT: 204 LBS

## 2021-06-04 NOTE — TELEPHONE ENCOUNTER
Rx previously filled for 90 days 11-19-19 - yrly f/u due 6/2019 - numerous appts cx - f/u now sched on 1-27-20 - refills to be obtained at appt.  mg

## 2021-06-07 NOTE — TELEPHONE ENCOUNTER
Patient states metoprolol is prescribed by his cardiology patient called the cardiology office he was told to request for PCP . Patient is requesting 90 days supply of Rx .  Refill Request  Did you contact pharmacy: No  Medication name:   Requested Prescriptions     Pending Prescriptions Disp Refills     metoprolol tartrate (LOPRESSOR) 50 MG tablet 180 tablet 0     Sig: Take 1 tablet (50 mg total) by mouth 2 (two) times a day.   Who prescribed the medication: JAHAIRA GREENE  Requested Pharmacy: [facility] # [id number]  Is patient out of medication: Yes  Patient notified refills processed in 3 business days:  yes  Okay to leave a detailed message: no

## 2021-06-07 NOTE — TELEPHONE ENCOUNTER
----- Message from Katerine Paulylupe sent at 4/6/2020  1:02 PM CDT -----  Regarding: LAURA PT - RX  Patient has already contacted their pharmacy. The medication or refill issue is below:    Primary Cardiologist: LAURA     Medication: metoprolol tartrate (LOPRESSOR) 50 MG tablet    Issue / Concern: Patient had a follow up with LAURA on 4/14 scheduled, but had to cancel because he is ill. He does not wish to reschedule at this time with everything going on. He is out of his medication and is wondering if he can get a refill without seeing Dr. Way     Preferred Pharmacy/City: [facility] #[id number] - [place], [place] - [address] AT [facility] [address]     Best Phone Number for Patient: [phone]    Additional Info:

## 2021-06-07 NOTE — TELEPHONE ENCOUNTER
RN cannot approve Refill Request    RN can NOT refill this medication --> PCP messaged that patient is overdue for Office Visit. Last office visit: 9/12/2018     Marsha Coyle, [facility] Triage/Med Refill 4/7/2020    Requested Prescriptions   Pending Prescriptions Disp Refills     metoprolol tartrate (LOPRESSOR) 50 MG tablet 180 tablet 0     Sig: Take 1 tablet (50 mg total) by mouth 2 (two) times a day.       Beta-Blockers Refill Protocol Failed - 4/6/2020  4:01 PM        Failed - PCP or prescribing provider visit in past 12 months or next 3 months     Last office visit with prescriber/PCP: 9/12/2018 aMson Montero MD OR same dept: Visit date not found OR same specialty: 9/12/2018 Mason Montero MD  Last physical: Visit date not found Last MTM visit: Visit date not found   Next visit within 3 mo: Visit date not found  Next physical within 3 mo: Visit date not found  Prescriber OR PCP: Mason Montero MD  Last diagnosis associated with med order: 1. Essential hypertension  - metoprolol tartrate (LOPRESSOR) 50 MG tablet; Take 1 tablet (50 mg total) by mouth 2 (two) times a day.  Dispense: 180 tablet; Refill: 0    If protocol passes may refill for 12 months if within 3 months of last provider visit (or a total of 15 months).             Passed - Blood pressure filed in past 12 months     BP Readings from Last 1 Encounters:   06/24/19 134/78

## 2021-06-11 NOTE — TELEPHONE ENCOUNTER
RN cannot approve Refill Request    RN can NOT refill this medication PCP messaged that patient is overdue for Labs. Last office visit: 9/12/2018 Mason Montero MD Last Physical: Visit date not found Last MTM visit: Visit date not found Last visit same specialty: 9/12/2018 Mason Montero MD.  Next visit within 3 mo: Visit date not found  Next physical within 3 mo: Visit date not found      Collette Colse, [facility] Triage/Med Refill 9/19/2020    Requested Prescriptions   Pending Prescriptions Disp Refills     lisinopriL (PRINIVIL,ZESTRIL) 10 MG tablet [Pharmacy Med Name: LISINOPRIL 10MG TABLETS] 90 tablet 3     Sig: TAKE 1 TABLET BY MOUTH ONCE DAILY       Ace Inhibitors Refill Protocol Failed - 9/18/2020  8:46 AM        Failed - PCP or prescribing provider visit in past 12 months       Last office visit with prescriber/PCP: 9/12/2018 Mason Montero MD OR same dept: Visit date not found OR same specialty: 9/12/2018 Mason Montero MD  Last physical: Visit date not found Last MTM visit: Visit date not found   Next visit within 3 mo: Visit date not found  Next physical within 3 mo: Visit date not found  Prescriber OR PCP: Mason Montero MD  Last diagnosis associated with med order: 1. Hypertension, unspecified type  - lisinopriL (PRINIVIL,ZESTRIL) 10 MG tablet [Pharmacy Med Name: LISINOPRIL 10MG TABLETS]; TAKE 1 TABLET BY MOUTH ONCE DAILY  Dispense: 90 tablet; Refill: 3    If protocol passes may refill for 12 months if within 3 months of last provider visit (or a total of 15 months).             Failed - Serum Potassium in past 12 months     No results found for: LN-POTASSIUM          Failed - Blood pressure filed in past 12 months     BP Readings from Last 1 Encounters:   06/24/19 134/78             Failed - Serum Creatinine in past 12 months     Creatinine   Date Value Ref Range Status   05/01/2018 0.80 0.70 - 1.30 mg/dL Final

## 2021-06-11 NOTE — TELEPHONE ENCOUNTER
RN cannot approve Refill Request    RN can NOT refill this medication Protocol failed and NO refill given. Last office visit: 9/12/2018 Mason Montero MD Last Physical: Visit date not found Last MTM visit: Visit date not found Last visit same specialty: 9/12/2018 Mason Montero MD.  Next visit within 3 mo: Visit date not found  Next physical within 3 mo: Visit date not found      Malika Ennis, [facility] Triage/Med Refill 9/25/2020    Requested Prescriptions   Pending Prescriptions Disp Refills     escitalopram oxalate (LEXAPRO) 20 MG tablet [Pharmacy Med Name: ESCITALOPRAM 20MG TABLETS] 90 tablet 3     Sig: TAKE 1 TABLET BY MOUTH ONCE DAILY       [facility] Refill Protocol  Failed - 9/23/2020 12:49 PM        Failed - PCP or prescribing provider visit in last year     Last office visit with prescriber/PCP: 9/12/2018 Mason Montero MD OR same dept: Visit date not found OR same specialty: 9/12/2018 Mason Montero MD  Last physical: Visit date not found Last MTM visit: Visit date not found   Next visit within 3 mo: Visit date not found  Next physical within 3 mo: Visit date not found  Prescriber OR PCP: Mason Montero MD  Last diagnosis associated with med order: 1. Depression  - escitalopram oxalate (LEXAPRO) 20 MG tablet [Pharmacy Med Name: ESCITALOPRAM 20MG TABLETS]; TAKE 1 TABLET BY MOUTH ONCE DAILY  Dispense: 90 tablet; Refill: 3    If protocol passes may refill for 12 months if within 3 months of last provider visit (or a total of 15 months).

## 2021-06-14 NOTE — PROGRESS NOTES
Domingo is a 65 y.o. male being evaluated via a billable phone visit.     Patient would like to be contacted via the following phone number [phone]    Notified to have pill bottles ready, pen for instructions, and estimated time of MD call given?  yes     ASSESSMENT/PLAN:    1. Hypertension  Ongoing treatment.     2. Cerebellar degeneration   Denies progression.     3. Hypertrophic cardiomyopathy   Ongoing treatment    4. Depression, unspecified depression type  He feels this is well treated with the escitalopram.     PLAN:  Patient Instructions   1. No changes in medications.     2. Maintain social distancing and caution    3. Follow up in 6 months.     No orders of the defined types were placed in this encounter.    Return in about 6 months (around 7/13/2021) for Recheck, hypertension.    CHIEF COMPLAINT:  Chief Complaint   Patient presents with     Follow-up     HISTORY OF PRESENT ILLNESS:  Domingo is a 65 y.o. male contacting the clinic today via phone for follow up.  He feels well.  Taking his medications as reviewed and prescribed.  No illness or fever, or cough or dyspnea.     REVIEW OF SYSTEMS:   No significant dyspnea, or cough, tremor and ataxia are stable.      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  Wt Readings from Last 3 Encounters:   06/24/19 204 lb (92.5 kg)   09/12/18 204 lb 4.8 oz (92.7 kg)   06/19/18 192 lb (87.1 kg)     PHYSICAL EXAM:  (observations via Phone)  Alert and oriented in NAD    The visit lasted a total of 11 minutes     MEDICATIONS: Reviewed and updated per CA and MD  Current Outpatient Medications   Medication Sig Dispense Refill     cholecalciferol, vitamin D3, (VITAMIN D3) 1,000 unit capsule Take 1,000 Units by mouth  "daily.       escitalopram oxalate (LEXAPRO) 20 MG tablet TAKE 1 TABLET BY MOUTH ONCE DAILY 90 tablet 3     lisinopriL (PRINIVIL,ZESTRIL) 10 MG tablet TAKE 1 TABLET BY MOUTH ONCE DAILY 90 tablet 3     metoprolol tartrate (LOPRESSOR) 50 MG tablet Take 1 tablet (50 mg total) by mouth 2 (two) times a day. 180 tablet 3     The patient has been notified of following:     \"This telephone visit will be conducted via a call between you and your physician/provider. We have found that certain health care needs can be provided without the need for a physical exam.  This service lets us provide the care you need with a short phone conversation.  If a prescription is necessary we can send it directly to your pharmacy.  If lab work is needed we can place an order for that and you can then stop by our lab to have the test done at a later time.    Patient would like to receive their AVS by AVS Preference: Alexandra.    Mason Montero MD      "

## 2021-06-14 NOTE — TELEPHONE ENCOUNTER
Spoke to patient.  He is out of metoprolol.  His last office visit was two years ago.  He does not monitor his blood pressure at home.  He states he feels good.      This CMT pushed patient to set up an in-person visit but he does not want to this due to pandemic.  Would provider agree to refill with a telephone visit?

## 2021-06-14 NOTE — TELEPHONE ENCOUNTER
Reason for Call:  Medication or medication refill:    Do you use a [facility]?  Name of the pharmacy and phone number for the current request: [facility]     Name of the medication requested: METOPROLOL 50MG 2 TABS DAILY #180    Other request: PHARMACY STATING HAS NOT HEARD BACK FROM PHYSICIAN    Can we leave a detailed message on this number? Yes    Phone number patient can be reached at:   Cell number on file:    Telephone Information:   Mobile [phone]       Best Time: ANYTIME    Call taken on 1/11/2021 at 9:49 AM by Kylee Torres

## 2021-06-14 NOTE — PATIENT INSTRUCTIONS - HE
1. No changes in medications.     2. Maintain social distancing and caution    3. Follow up in 6 months.

## 2021-06-15 NOTE — TELEPHONE ENCOUNTER
Pt reports multiple symptoms, questions what he should do:    6-9 months of alcoholism  Periods of no urination, he has been urinating now, urine is orange  Intermittent mild abdominal pain  Weakness and abnormal balance  Poor nutrition    He denies a fever, shortness of breath.     Disposition:  See a provider today.  Patient verbalized understanding and had no further questions.  He plans to have his son drive him to [place] ED tomorrow.      COVID 19 Nurse Triage Plan/Patient Instructions    Please be aware that novel coronavirus (COVID-19) may be circulating in the community. If you develop symptoms such as fever, cough, or SOB or if you have concerns about the presence of another infection including coronavirus (COVID-19), please contact your health care provider or visit  [email or web address].    Disposition/Instructions    In-Person Visit with provider recommended. Reference Visit Selection Guide.    Thank you for taking steps to prevent the spread of this virus.  o Limit your contact with others.  o Wear a simple mask to cover your cough.  o Wash your hands well and often.    Resources    [facility]: About COVID-19: [email or web address]    CDC: What to Do If You're Sick: www.cdc.gov/coronavirus/2019-ncov/about/steps-when-sick.html    CDC: Ending Home Isolation: www.cdc.gov/coronavirus/2019-ncov/hcp/disposition-in-home-patients.html     CDC: Caring for Someone: www.cdc.gov/coronavirus/2019-ncov/if-you-are-sick/care-for-someone.html     [facility]: Interim Guidance for Hospital Discharge to Home: www.health.[facility].[place]/diseases/coronavirus/hcp/hospdischarge.pdf    [facility] clinical trials (COVID-19 research studies): clinicalaffairs.[facility]/um-clinical-trials     Below are the COVID-19 hotlines at the [place] Department of Health ([facility]). Interpreters are available.   o For health questions: Call 337-838-2478 or 1-342.121.9482 (7 a.m. to 7 p.m.)  o For questions about  schools and childcare: Call 490-506-9591 or 1-965.838.2633 (7 a.m. to 7 p.m.)     Keara Estevez RN, [facility]    Reason for Disposition    Side (flank) or lower back pain present    Additional Information    Negative: Shock suspected (e.g., cold/pale/clammy skin, too weak to stand, low BP, rapid pulse)    Negative: Sounds like a life-threatening emergency to the triager    Negative: [1] Unable to urinate (or only a few drops) > 4 hours AND     [2] bladder feels very full (e.g., palpable bladder or strong urge to urinate)    Negative: [1] Decreased urination and [2] drinking very little AND [2] dehydration suspected (e.g., dark urine, no urine > 12 hours, very dry mouth, very lightheaded)    Negative: Patient sounds very sick or weak to the triager    Negative: Fever > 100.5 F (38.1 C)    Protocols used: URINARY SYMPTOMS-A-AH

## 2021-06-16 PROBLEM — R27.0 ATAXIA: Status: ACTIVE | Noted: 2018-05-30

## 2021-06-16 PROBLEM — I42.2 HYPERTROPHIC CARDIOMYOPATHY (H): Status: ACTIVE | Noted: 2018-05-30

## 2021-06-17 NOTE — PROGRESS NOTES
ASSESSMENT/PLAN:    1. Tobacco dependence  Currently not ready to stop, will address in the future.     2. Hypertension  Needs to resume treatment.  Will start lisiniopril 10 mg po daily and follow up control.  Mild bifrontal headaches are likely related to uncontrolled hypertension.     3. Depression  Mild.  He denies any thoughts of self harm.  Alcohol likely has played a role. Will start escitalopram 20mg po daily.     4. Alcohol dependence  He has quit on his own, now sober a few weeks, did not have significant withdrawal.  Some of his depression feelings could be related.  I reinforced sobriety..     4. Ataxia  Bilateral, both LE's.  Exam suggests a neuropathy.  Possibly related to chronic alcohol, or could be something like a para - neoplastic process. Exam is also not suggestive of cerebellar degeneration.  Can't exclude significant cervical or lumbar spinal stenosis, although exam is not strongly suggestive.  He may have pseudoclaudication.  Will assess metabolically, and for B12, and MRI imaging.  Will follow up pending this evaluation.  Likely will need   - MR Lumbar Spine Without Contrast; Future  - MR Brain Without Contrast; Future  - MR Cervical Spine Without Contrast; Future  - Magnesium  - C-Reactive Protein(CRP)  - Comprehensive Metabolic Panel  - HM1(CBC and Differential)  - Thyroid Stimulating Hormone (TSH)  - T4, Free  - Vitamin B12  - HM1 (CBC with Diff)    5. Heart murmur  Exam does reveal systolic murmur.  Unclear significance, will get echocardiogram.    - Echo Complete; Future    This visit was for a total of 45 minutes face to face with the patient. Over 50% of this time was spent in care coordination, counseling and educating the patient about alcohol withdrawal, gait ataxia, possible cervical or lumbar spinal stenosis, depression    CHIEF COMPLAINT:  Chief Complaint   Patient presents with     Hypertension     Establish Care     HISTORY OF PRESENT ILLNESS:  Domingo is a 62 y.o. male  presenting to the clinic today with mild bifrontal headache.  Has not been on his medications for depression or hypertension for about one year.  Is semi-retired .  Quit drinking alcohol a number of weeks ago.  No significant withdrawal is reported.  Does smoke cigarettes.  No recent fever, or chills, or nausea or diarrhea.  No chest or abdominal pain.  Has had 'stiff' legs in the past year with slow ambulation, no one arm or leg numbness or weakness.  No visual disturbance, or difficulties with swallowing.     REVIEW OF SYSTEMS:   Constitutional: no fever, chills, or sweats  Musculoskeletal: no focal joint pain or inflammation  Neurological: see HPI  Psychiatric: lack of interest and motivation, see HPI  All other systems on reveiw are negative.    PFSH:    History   Smoking Status     Current Every Day Smoker   Smokeless Tobacco     Never Used     Family History   Problem Relation Age of Onset     Asperger's syndrome Son      Other Daughter      opioid dependency     Cancer Mother      probable colon     Prostate cancer Father      Social History     Social History     Marital status:      Spouse name: N/A     Number of children: 2     Years of education: N/A     Occupational History           Social History Main Topics     Smoking status: Current Every Day Smoker     Smokeless tobacco: Never Used     Alcohol use No     Drug use: Not on file     Sexual activity: Not on file     Other Topics Concern     Not on file     Social History Narrative    Semi- retired, manages apartment       Past Surgical History:   Procedure Laterality Date     APPENDECTOMY       KIDNEY STONE SURGERY       No Known Allergies    Active Ambulatory Problems     Diagnosis Date Noted     Familial (Benign Essential) Tremor      Depression      Hypertension      Hyperglycemia      Vasovagal Syncope      Tobacco dependence      Resolved Ambulatory Problems     Diagnosis Date Noted     Abdominal pain      Past Medical  "History:   Diagnosis Date     Depression      Essential tremor      History of closed head injury      Hypertension      Nephrolithiasis      Tobacco dependence      VITALS:  Vitals:    05/01/18 1128 05/01/18 1130   BP: (!) 142/92 138/90   Patient Site: Right Arm Left Arm   Patient Position: Sitting Sitting   Cuff Size: Adult Regular Adult Regular   Pulse: (!) 102    Weight: 198 lb (89.8 kg)    Height: 6' 1\" (1.854 m)      Wt Readings from Last 3 Encounters:   05/01/18 198 lb (89.8 kg)     Body mass index is 26.12 kg/(m^2).    PHYSICAL EXAM:  General Appearance: In no acute distress  /90 (Patient Site: Left Arm, Patient Position: Sitting, Cuff Size: Adult Regular)  Pulse (!) 102  Ht 6' 1\" (1.854 m)  Wt 198 lb (89.8 kg)  BMI 26.12 kg/m2  HEENT: Without congestion or inflammation  NECK:  without adenopathy or thryroid enlargement  CHEST: no focal tenderness   RESPIRATORY: Clear to auscultation, no wheezes or rales  CARDIOVASCULAR: S1, S2, with systolic murmur, no rub, or gallop   ABDOMEN: soft, flat, and non-tender, without hepatosplenomegaly, mass, rebound, or guarding  MUSCULOSKELETAL: No joint swelling, or inflammation, hips and knees have good ROM  PERIPHERAL PULSES:  diminished, no edema  NEUROLOGIC: cranial nerves are intact. Cerebellar F - N and Heel to shin are negative.  Romberg positive.  Gait is ataxic and shuffling.   PSYCHIATRIC: Oriented X 3, without confusion, behavior and affect normal    Current Outpatient Prescriptions   Medication Sig Dispense Refill     escitalopram oxalate (LEXAPRO) 20 MG tablet Take 1 tablet (20 mg total) by mouth daily. 30 tablet 2     lisinopril (PRINIVIL,ZESTRIL) 10 MG tablet Take 1 tablet (10 mg total) by mouth daily. 30 tablet 11     No current facility-administered medications for this visit.      "

## 2021-06-18 NOTE — PROGRESS NOTES
ASSESSMENT AND PLAN:    1. Cerebellar degeneration  With Heel to shin test today, clearly there is cerebellar findings strongly suggesting that his gait disorder is cerebellar degeneration related to alcohol use.  He remains sober.     2. Ataxia  See above.  He has evaluation scheduled with neurology.     3. Hypertension  Controlled.     4. Hypertrophic cardiomyopathy  Being evaluated by cardiology.     5. Depression with alcohol withdrawal  Has been on escitalopram.  Seems to be improving, more sense of humor, more quick minded. AM dysphoria improves during the day.  Sleep some better. Will continue escitalopram for now.  And follow.     6. Headache -  They are improving, lest persistent.  Clinically they remain non-specific fronto occipital muscle tension type. His cervical spondylosis may play a role, but ROM is pretty good today, no radicular symptoms any more.      7. Abdominal pain  Abdomen remains benign on exam, CT abdomen is negative. Suspect related to dysthymia/depression and dysphoria and it is not progressing.  Reassured.     CHIEF COMPLAINT:  Chief Complaint   Patient presents with     Follow-up     on going concern     HISTORY OF PRESENT ILLNESS:  Domingo Mina Jr. is a 63 y.o. male with bifrontal headaches mostly in the AM, without visual disturbance, or new arm or leg numbness or tingling.  Remains off alcohol, gait is the same.  No dizziness or dyspnea.  Has seen cardiology.  No fever, or unusual cough.  Gets vague abdominal fullness and discomfort without nausea or emesis, or diarrhea.     REVIEW OF SYSTEMS:   See HPI, all other pertinent systems on review are negative.    Active Ambulatory Problems     Diagnosis Date Noted     Familial (Benign Essential) Tremor      Depression      Hypertension      Hyperglycemia      Vasovagal Syncope      Tobacco dependence      Cerebellar degeneration 05/30/2018     Ataxia 05/30/2018     Hypertrophic cardiomyopathy 05/30/2018     Resolved Ambulatory  Problems     Diagnosis Date Noted     Abdominal pain      Past Medical History:   Diagnosis Date     Ataxia 5/30/2018     Cerebellar degeneration 5/30/2018     Depression      Essential tremor      History of closed head injury      Hypertension      Hypertrophic cardiomyopathy      Hypertrophic cardiomyopathy 5/30/2018     Nephrolithiasis      Tobacco dependence      Vitamin D deficiency      Past Surgical History:   Procedure Laterality Date     APPENDECTOMY       KIDNEY STONE SURGERY       VITALS:  Vitals:    05/30/18 1107   BP: 130/70   Patient Site: Right Arm   Patient Position: Sitting   Cuff Size: Adult Regular   Pulse: 62   SpO2: 98%   Weight: 195 lb (88.5 kg)     Wt Readings from Last 3 Encounters:   05/30/18 195 lb (88.5 kg)   05/15/18 197 lb (89.4 kg)   05/14/18 194 lb 12.8 oz (88.4 kg)     PHYSICAL EXAM:  Constitutional:  Well appearing in NAD, alert and oriented  Neck:  Supple, no significant adenopathy.  Cardiac:  S1 S2 without murmur, rhythm regular  Lungs: Clear to auscultation, without wheezes or rales  Abdomen:   Soft, flat and non-tender, without hepatosplenomegaly, guarding, rebound, or mass.    Neurologic:  Speech clear, gait is shuffling and broad based.  Heel to shin reveals dysmetria    Psychiatric:  Mood appropriate, memory intact.     Current Outpatient Prescriptions   Medication Sig Dispense Refill     escitalopram oxalate (LEXAPRO) 20 MG tablet Take 1 tablet (20 mg total) by mouth daily. 30 tablet 2     lisinopril (PRINIVIL,ZESTRIL) 10 MG tablet Take 1 tablet (10 mg total) by mouth daily. 30 tablet 11     metoprolol tartrate (LOPRESSOR) 50 MG tablet Take 0.5 tablets (25 mg total) by mouth 2 (two) times a day. 60 tablet 5     No current facility-administered medications for this visit.      Mason Montero MD  Internal Medicine  [facility]

## 2021-06-18 NOTE — PROGRESS NOTES
ASSESSMENT AND PLAN:    1. Abdominal pain  Non-specific symptoms, and exam is negative.  Will evaluate with CT scan.  Possible the GI complaints with malaise and dysphoria are related to ongoing effects of recent alcohol withdrawal.  He is taking escitalopram and will continue, now at 20 mg po daily.    - CT Abdomen Pelvis With Oral With IV Contrast; Future    2. Cardiomyopathy, hypertrophic  Heart murmur, and echo - he is only child, no clear family history of cardiomyopathy.  He has upcoming appointment with cardiology.      3. Cervical spondylosis  Moderately severe. Clinically without cervical radiculopathy and MRI is negative for myelopathy. Likely plays a role in headache.        4. Lumbar spondylosis  Moderately severe. Clinically not having radiculopathy.  Spinal stenosis may be significant,  MRI is negative for myelopathy. He has appointment with neurology.  Likely plays a role in back pain.     5. Gait ataxia  Ongoing and stable.  Moderately severe. Possible plexopathy or cerebellar degeneration.     6.  Alcohol dependence  He remains off all alcohol.      CHIEF COMPLAINT:  Chief Complaint   Patient presents with     Abdominal Pain     Headache     Fatigue     Back Pain     lower back      HISTORY OF PRESENT ILLNESS:  Domingo Mina Jr. is a 62 y.o. male with ongoing bilateral headaches, without visual or arm or leg focal symptoms.  Headaches respond with acetaminophen and/or ibuprofen.  He also complains of abdominal bloating, some mid back pain, and gaseousness.  No diarrhea, or bleeding.  No GI symptoms during the night.  Still has dysphoria and malaise but not using alcohol.  No increased dyspnea or chest pain.  No acid dyspepsia.  Able to eat three meals daily without increase or specific adverse reaction to eating.  Low back pain is mostly positional without arm or leg radicular symptoms.  Voiding OK.     REVIEW OF SYSTEMS:   See HPI, all other pertinent systems on review are negative.    Active  Ambulatory Problems     Diagnosis Date Noted     Familial (Benign Essential) Tremor      Depression      Hypertension      Hyperglycemia      Vasovagal Syncope      Tobacco dependence      Resolved Ambulatory Problems     Diagnosis Date Noted     Abdominal pain      Past Medical History:   Diagnosis Date     Depression      Essential tremor      History of closed head injury      Hypertension      Hypertrophic cardiomyopathy      Nephrolithiasis      Tobacco dependence      Past Surgical History:   Procedure Laterality Date     APPENDECTOMY       KIDNEY STONE SURGERY       VITALS:  Vitals:    05/14/18 1115   BP: 138/82   Patient Site: Left Arm   Patient Position: Sitting   Cuff Size: Adult Regular   Pulse: 95   SpO2: 97%   Weight: 194 lb 12.8 oz (88.4 kg)     Wt Readings from Last 3 Encounters:   05/14/18 194 lb 12.8 oz (88.4 kg)   05/07/18 198 lb (89.8 kg)   05/01/18 198 lb (89.8 kg)     PHYSICAL EXAM:  Constitutional:  Well appearing in NAD, alert and oriented  Neck:  Supple, no significant adenopathy.  Cardiac:  S1 S2 with systolic murmur, rhythm regular  Lungs: Clear to auscultation, without wheezes or rales  Abdomen:   Soft, flat and non-tender, without hepatosplenomegaly, guarding, rebound, or mass.    Neurologic:  Speech clear, gait is shuffling, severely, no tremor, cranial nerves and cerebellar are intact.     Psychiatric:  Mood appropriate, memory intact.     Current Outpatient Prescriptions   Medication Sig Dispense Refill     escitalopram oxalate (LEXAPRO) 20 MG tablet Take 1 tablet (20 mg total) by mouth daily. 30 tablet 2     lisinopril (PRINIVIL,ZESTRIL) 10 MG tablet Take 1 tablet (10 mg total) by mouth daily. 30 tablet 11     No current facility-administered medications for this visit.      Mason Montero MD  Internal Medicine  [facility]

## 2021-06-19 NOTE — LETTER
Letter by Ha Way MD at      Author: Ha Way MD Service: -- Author Type: --    Filed:  Encounter Date: 6/11/2019 Status: (Other)         Domingo TORO Mina Jr.  [address]  [place] [address]      June 11, 2019      Dear Domingo,    This letter is to remind you that you will be due for your follow up appointment with Dr. Ha Way  . To help ensure you are in the best health possible, a regular follow-up with your cardiologist is essential.     Please call our Patient Scheduling Line at [phone] to schedule your appointment at your earliest convenience.  If you have recently scheduled an appointment, please disregard this letter.    We look forward to seeing you again. As always, we are available at the number  above for any questions or concerns you may have.      Sincerely,     The Physicians and Staff of [facility]

## 2021-06-19 NOTE — LETTER
Letter by Ha Way MD at      Author: Ha Way MD Service: -- Author Type: --    Filed:  Encounter Date: 5/8/2019 Status: (Other)         Domingo ENGEL Keeley Parr  [address]  [place] [address]      May 8, 2019      Dear Domingo,    This letter is to remind you that you will be due for your follow up appointment with Dr. Ha Way. To help ensure you are in the best health possible, a regular follow-up with your cardiologist is essential.     Please call our Patient Scheduling Line at [phone] to schedule your appointment at your earliest convenience.  If you have recently scheduled an appointment, please disregard this letter.    We look forward to seeing you again. As always, we are available at the number  above for any questions or concerns you may have.      Sincerely,     The Physicians and Staff of [facility]

## 2021-06-20 NOTE — PROGRESS NOTES
ASSESSMENT AND PLAN:    1. Fatigue  Clinically this is suggestive of partially treated and improving depression likely caused by alcoholism and withdrawal.  He is 4 months sober.  Affect is improved.  His fatigue is mostly low motivation.  I offered referral to psychiatry or additional medications.  He feels and I agree that he is improving with escitalopram.  Will continue as present.  He voices no ideation of self harm.     2. Cerebellar degeneration  Work up is complete and symptoms are not progressing.  He has had PT with some benefit, he feels.     2. Hypertension  Controlled on current medication.     3. Hypertrophic cardiomyopathy (H)  Work up by Cardiology is complete, he is compensated with likely an element of chronic diastolic/restrictive heart failure, that is clinically compensated.     4. Screening for prostate cancer  PSA 2015 was 2+  Will recheck.  He has no significant symptoms.    - PSA, Annual Screen (Prostatic-Specific Antigen)    5. Depression, unspecified depression type  See above, clinically improving.       CHIEF COMPLAINT:  Chief Complaint   Patient presents with     Hypertension     HISTORY OF PRESENT ILLNESS:  Domingo Mina Jr. is a 63 y.o. male with follow up.  Has had evaluation of his cardiomopathy and his cerebellar degeneration.  Remains sober for 4 months.  He feels escitalopram is helping, but still feels a lack of motivation and interest.  Sleeping OK, no self harm ideation.  Denies fever, or chills, or night sweats, or unusual cough.  No chest pain, or increasing dyspnea. No significant dyspnea.  Gait is unchanged.  Did go to PT for his cerebellar degeneration.     REVIEW OF SYSTEMS:   See HPI, all other pertinent systems on review are negative.    Active Ambulatory Problems     Diagnosis Date Noted     Familial (Benign Essential) Tremor      Depression      Hypertension      Vasovagal Syncope      Tobacco dependence      Cerebellar degeneration 05/30/2018     Ataxia  "05/30/2018     Hypertrophic cardiomyopathy (H) 05/30/2018     Resolved Ambulatory Problems     Diagnosis Date Noted     Hyperglycemia      Abdominal pain      Past Medical History:   Diagnosis Date     Ataxia 5/30/2018     Cerebellar degeneration 5/30/2018     Depression      Essential tremor      History of closed head injury      Hypertension      Hypertrophic cardiomyopathy (H)      Hypertrophic cardiomyopathy (H) 5/30/2018     Nephrolithiasis      Tobacco dependence      Vitamin D deficiency        Past Surgical History:   Procedure Laterality Date     APPENDECTOMY       KIDNEY STONE SURGERY       VITALS:  Vitals:    09/12/18 0958   BP: 138/88   Patient Site: Left Arm   Patient Position: Sitting   Cuff Size: Adult Regular   Pulse: 60   SpO2: 97%   Weight: 204 lb 4.8 oz (92.7 kg)   Height: 6' 1\" (1.854 m)     Wt Readings from Last 3 Encounters:   09/12/18 204 lb 4.8 oz (92.7 kg)   06/19/18 192 lb (87.1 kg)   05/30/18 195 lb (88.5 kg)     PHYSICAL EXAM:  Constitutional:  Well appearing in NAD, alert and oriented  Neck:  Supple, no significant adenopathy.  Cardiac:  S1 S2 with systolic murmur, rhythm regular  Lungs: Clear to auscultation  Abdomen:   Soft, flat and non-tender, without guarding, rebound, or mass.    Extremities: no significant edema  Neurologic:  Speech clear, ataxic gait   Psychiatric:  Mood appropriate, memory intact.     Current Outpatient Prescriptions   Medication Sig Dispense Refill     cholecalciferol, vitamin D3, (VITAMIN D3) 1,000 unit capsule Take 1,000 Units by mouth daily.       escitalopram oxalate (LEXAPRO) 20 MG tablet TAKE ONE TABLET BY MOUTH ONE TIME DAILY  90 tablet 3     lisinopril (PRINIVIL,ZESTRIL) 10 MG tablet Take 1 tablet (10 mg total) by mouth daily. 90 tablet 3     metoprolol tartrate (LOPRESSOR) 50 MG tablet Take 1 tablet (50 mg total) by mouth 2 (two) times a day. 180 tablet 3     No current facility-administered medications for this visit.      Mason Montero, " MD  Internal Medicine  [facility]

## 2021-06-23 ENCOUNTER — COMMUNICATION - HEALTHEAST (OUTPATIENT)
Dept: INTERNAL MEDICINE | Facility: CLINIC | Age: 66
End: 2021-06-23

## 2021-06-26 NOTE — PROGRESS NOTES
Progress Notes by Ha Way MD at 6/19/2018 10:10 AM     Author: Ha Way MD Service: -- Author Type: Physician    Filed: 6/19/2018 10:34 AM Encounter Date: 6/19/2018 Status: Signed    : Ha Way MD (Physician)           Click to link to [facility]     [facility] HEART CARE NOTE    Thank you, Dr. Mason Montero MD, for asking the [facility] team to see Mr. Domingo Mina Jr. to evaluate Follow-up for an abnormal echocardiogram.      Assessment/Recommendations   Assessment:    1. Hypertrophic cardiomyopathy - stable. No syncope or concerns for arrhythmia or symptomatic LVOT obstruction.  2. Hypertension - BP elevated today.  3. Tobacco use - I again recommended complete tobacco cessation.  He stated a willingness to attempt to quit smoking.  He declined a medication to aid in his attempt to quit.  I spent at least 3 minutes of today's encounter on smoking cessation counseling.    Plan:  1. Increase metoprolol tartrate to 50 mg two times a day  2. I advised his children have screening echocardiograms to look for hypertrophic cardiomyopathy  3. Follow up in 1 year with an echo prior to reassess LVOT gradient.         History of Present Illness   Mr. Domingo Mina Jr. is a 63 y.o. male who presents for follow-up of hypertrophic cardiomyopathy.    I last saw Mr. Mina about a month ago after an echo suggested the diagnosis of HCM. This was confirmed by cardiac MRI, though only a small amount of late gadolinium enhancement was identified. He was initially started on diltiazem, but this was changed to metoprolol due to insurance coverage.    Today, he states that he has some headaches and abdominal discomfort that is chronic. These mostly started since Mr. Mina stopped drinking alcohol. No palpitations. Rare orthostatic light headedness.    Other than noted above, Mr. Mina denies any chest pain/pressure/tightness, shortness of breath at rest or  with exertion, light headedness/dizziness, pre-syncope, syncope, lower extremity swelling, palpitations, paroxysmal nocturnal dyspnea (PND), or orthopnea.     Cardiac Problems and Cardiac Diagnostics   Cardiac Problem List:  1. Hypertrophic cardiomyopathy with mild LVOT obstruction. Diagnosed at age 63. Small amount of gadolinium enhancement on cardiac MRI.  2. Essential hypertension    Most Recent Cardiac testing:  ECG dated 5/15/18 (personaly reviewed and interpreted): Normal sinus rhythm with left ventricular hypertrophy and mild repolarization abnormality.    ECHO (report reviewed):   Echo results:   Results for orders placed during the hospital encounter of 05/07/18   Echo Complete [ECH10] 05/07/2018    Narrative   Left Ventricle: Normal left ventricular size.The calculated left   ventricular ejection fraction is 63%. This represents a normal ejection   fraction. Severe asymmetric predominantly basal septal hypertrophy with   ovoid cavity with gradient observed. Moderate outflow tract obstruction is   present. The findings are consistent with hypertrophic cardiomyopathy with   obstruction.    Left Atrium: Left atrial volume is mildly increased.    Mitral Valve: Mild mitral regurgitation. Moderate systolic anterior   motion of the anterior leaflet is present.    Thoracic Aorta: The aortic root is moderately dilated.    Normal right ventricular size and systolic function.        Cardiac MRI 6/6/18:  IMPRESSIONS:   1. Hypertrophy cardiomyopathy with LVOT obstruction. It appears thickened basal anterior and anterospetal walls with maximal measurement of 2.5 cm. The quantified left ventricular ejection fraction is 83%.   2. There are small amount patchy gadolinium enhancement in thickened basal anteroseptal segment.   3. Normal right ventricular size function.   4. Mild to moderate mitral valve regurgitation.  5. Mildly dilated mid ascending aorta.    Stress test: no prior    Cardiac cath: no prior     Medications   Allergies   Current Outpatient Prescriptions   Medication Sig Dispense Refill   ? escitalopram oxalate (LEXAPRO) 20 MG tablet Take 1 tablet (20 mg total) by mouth daily. 30 tablet 2   ? lisinopril (PRINIVIL,ZESTRIL) 10 MG tablet Take 1 tablet (10 mg total) by mouth daily. 30 tablet 11   ? metoprolol tartrate (LOPRESSOR) 50 MG tablet Take 0.5 tablets (25 mg total) by mouth 2 (two) times a day. 60 tablet 5     No current facility-administered medications for this visit.       No Known Allergies     Physical Examination Review of Systems   Vitals:    06/19/18 1007   BP: 150/80   Pulse: 68   Resp: 16     Body mass index is 25.33 kg/(m^2).  Wt Readings from Last 3 Encounters:   06/19/18 192 lb (87.1 kg)   05/30/18 195 lb (88.5 kg)   05/15/18 197 lb (89.4 kg)       General Appearance:   Pleasant, middle aged man, no distress, normal body habitus   ENT/Mouth: membranes moist, no apparent gingival bleeding.      EYES:  no scleral icterus, normal conjunctivae   Neck: no carotid bruits. No anterior cervical lymphadenopaty   Respiratory:   lungs are clear to auscultation, no rales or wheezing, equal chest wall expansion    Cardiovascular:   Regular rhythm, normal rate. Normal first and second heart sounds with 3/6 systolic murmur with mid systolic onset and late peak. no rubs, or gallops; the carotid, radial and posterior tibial pulses are intact, Jugular venous pressure normal, no edema bilaterally    Abdomen/GI:  no organomegaly, masses, bruits, or tenderness; bowel sounds are present   Extremities: no cyanosis or clubbing   Skin: no xanthelasma, warm.    Heme/lymph/ Immunology No apparent bleeding noted.   Neurologic: Alert and oriented. Gait is antalgic. Pt uses cane. no tremors     Psychiatric: Pleasant, calm, appropriate affect.    A complete 10 system review of systems was performed and is negative except as mentioned in the HPI or below:  General: WNL  Eyes: WNL  Ears/Nose/Throat: WNL  Lungs: WNL  Heart: WNL  Stomach:  WNL  Bladder: WNL  Muscle/Joints: Muscle Weakness  Skin: WNL  Nervous System: WNL  Mental Health: WNL     Blood: WNL       Past History   Past Medical History:   Past Medical History:   Diagnosis Date   ? Ataxia 5/30/2018   ? Cerebellar degeneration 5/30/2018   ? Depression    ? Essential tremor    ? History of closed head injury    ? Hypertension    ? Hypertrophic cardiomyopathy (H)    ? Hypertrophic cardiomyopathy (H) 5/30/2018   ? Nephrolithiasis    ? Tobacco dependence    ? Vitamin D deficiency        Past Surgical History:   Past Surgical History:   Procedure Laterality Date   ? APPENDECTOMY     ? KIDNEY STONE SURGERY         Family History:   Family History   Problem Relation Age of Onset   ? Asperger's syndrome Son    ? Other Daughter      opioid dependency   ? Cancer Mother      probable colon   ? Prostate cancer Father    no family history of sudden cardiac death, sustained arrhythmias, or HOCM.    Social History:   Social History     Social History   ? Marital status:      Spouse name: N/A   ? Number of children: 2   ? Years of education: N/A     Occupational History   ?       Social History Main Topics   ? Smoking status: Current Every Day Smoker   ? Smokeless tobacco: Never Used   ? Alcohol use No   ? Drug use: No   ? Sexual activity: Not on file     Other Topics Concern   ? 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             Lab Results    Chemistry/lipid CBC Cardiac Enzymes/BNP/TSH/INR   Lab Results   Component Value Date    CHOL 262 (H) 11/03/2014     11/03/2014    LDLCALC 122 11/03/2014    TRIG 67 11/03/2014    CREATININE 0.80 05/01/2018    BUN 21 05/01/2018    K 4.5 05/01/2018     05/01/2018     05/01/2018    CO2 23 05/01/2018    Lab Results   Component Value Date    WBC 7.9 05/01/2018    HGB 15.7 05/01/2018    HCT 45.2 05/01/2018    MCV 91 05/01/2018     05/01/2018    Lab Results    Component Value Date    TSH 1.75 05/01/2018    INR 1.04 05/14/2013          Ha Way MD  Non-invasive Cardiology  [facility]

## 2021-06-26 NOTE — TELEPHONE ENCOUNTER
Reason for Call: Xi[facility] is calling to confirm that PCP will follow for home care services.  Last seen 01/2021, face to face completed 6/17/21.    Detailed comments: verbal order ok to give    Phone Number Patient can be reached at: Other phone number:  [phone]    Best Time: any    Can we leave a detailed message on this number?: Yes    Call taken on 6/23/2021 at 1:25 PM by Marry Ortega

## 2021-06-26 NOTE — PROGRESS NOTES
Progress Notes by Ha Wya MD at 5/15/2018  8:30 AM     Author: Ha Way MD Service: -- Author Type: Physician    Filed: 5/16/2018  8:31 AM Encounter Date: 5/15/2018 Status: Addendum    : Ha Way MD (Physician)    Related Notes: Original Note by Ha Way MD (Physician) filed at 5/15/2018  9:05 AM           Click to link to [facility]     [facility] HEART CARE NOTE    Thank you, Dr. Mason Montero MD, for asking the [facility] team to see Mr. Domingo Mina Jr. to evaluate Consult for an abnormal echocardiogram.      Assessment/Recommendations   Assessment:    1. Asymmetric septal hypertrophy - in a 63 y/o man, this may represent hypertrophic cardiomyopathy or senile asymmetric septal hypertrophy from longstanding hypertension. He did have one concerning episode of syncope that occurred in the context of mild alcohol consumption. Will evaluate further with a cardiac MRI. No family history of premature sudden cardiac death.  2. Hypertension - mildly elevated today. With an outflow tract gradient would favor treating with a CCB or b-blocker which will decrease his LVOT gracient before increasing an afterload reducing agent (such as lisinopril) which can increase the dynamic LVOT gradient.  3. Tobacco use - I recommended complete tobacco cessation.  He stated a willingness to attempt to quit smoking.  He declined a medication to aid in his attempt to quit.  I spent at least 3 minutes of today's encounter on smoking cessation counseling.    Plan:  1. Start diltiazem 120 mg daily  2. Cardiac MRI with and without contrast to evaluate for myofibril disarray and the presence of hypertrophic cardiomyopathy.  3. Follow up in 4 weeks after cardiac MRI.         History of Present Illness   Mr. Domingo Mina Jr. is a 62 y.o. male with a significant past history of hypertension who presents for evaluation of an abnormal echocardiogram.    MR. Mina  is less active now than he used to be a few years ago. He became less active due to heavier alcohol use, but has now quit using alcohol. He also has developed some balance issues, chronic headache, and intermittent abdominal pain, which also limits his motivation to be active. He does have some light headedness that occurs at random times. These sensations are transient and resolve after a few seconds. They do not reach the level of pre-syncope.     Mr. Mina reports that he has had one episode of sudden syncope in 2013 when he was walking home after having a 2-3 beers. He presented to the ER and was monitored overnight. He denies any other episodes of syncope in his lifetime. He denies ever having any palpitations, brief or sustained.    Other than noted above, Mr. Mina denies any chest pain/pressure/tightness, shortness of breath at rest or with exertion, light headedness/dizziness, pre-syncope, syncope, lower extremity swelling, palpitations, paroxysmal nocturnal dyspnea (PND), or orthopnea.     Cardiac Problems and Cardiac Diagnostics   Cardiac Problem List:  1. Dynamic left ventricular outflow tract obstruction  2. Essential hypertension    Most Recent Cardiac testing:  ECG dated 5/15/18 (personaly reviewed and interpreted): Normal sinus rhythm with left ventricular hypertrophy and mild repolarization abnormality.    ECHO (report reviewed):   Echo results:   Results for orders placed during the hospital encounter of 05/07/18   Echo Complete [ECH10] 05/07/2018    Narrative   Left Ventricle: Normal left ventricular size.The calculated left   ventricular ejection fraction is 63%. This represents a normal ejection   fraction. Severe asymmetric predominantly basal septal hypertrophy with   ovoid cavity with gradient observed. Moderate outflow tract obstruction is   present. The findings are consistent with hypertrophic cardiomyopathy with   obstruction.    Left Atrium: Left atrial volume is mildly increased.     Mitral Valve: Mild mitral regurgitation. Moderate systolic anterior   motion of the anterior leaflet is present.    Thoracic Aorta: The aortic root is moderately dilated.    Normal right ventricular size and systolic function.          Stress test: no prior    Cardiac cath: no prior     Medications  Allergies   Current Outpatient Prescriptions   Medication Sig Dispense Refill   ? escitalopram oxalate (LEXAPRO) 20 MG tablet Take 1 tablet (20 mg total) by mouth daily. 30 tablet 2   ? lisinopril (PRINIVIL,ZESTRIL) 10 MG tablet Take 1 tablet (10 mg total) by mouth daily. 30 tablet 11   ? diltiazem (CARDIZEM LA) 120 mg 24 hr tablet Take 1 tablet (120 mg total) by mouth daily. 30 tablet 11     No current facility-administered medications for this visit.       No Known Allergies     Physical Examination Review of Systems   Vitals:    05/15/18 0816   BP: (!) 144/98   Pulse: 76   Resp: 18     Body mass index is 25.99 kg/(m^2).  Wt Readings from Last 3 Encounters:   05/15/18 197 lb (89.4 kg)   05/14/18 194 lb 12.8 oz (88.4 kg)   05/07/18 198 lb (89.8 kg)       General Appearance:   Pleasant, middle aged man, no distress, normal body habitus   ENT/Mouth: membranes moist, no apparent gingival bleeding.      EYES:  no scleral icterus, normal conjunctivae   Neck: no carotid bruits. No anterior cervical lymphadenopaty   Respiratory:   lungs are clear to auscultation, no rales or wheezing, equal chest wall expansion    Cardiovascular:   Regular rhythm, normal rate. Normal first and second heart sounds with 3/6 systolic murmur. no rubs, or gallops; the carotid, radial and posterior tibial pulses are intact, Jugular venous pressure normal, no edema bilaterally    Abdomen/GI:  no organomegaly, masses, bruits, or tenderness; bowel sounds are present   Extremities: no cyanosis or clubbing   Skin: no xanthelasma, warm.    Heme/lymph/ Immunology No apparent bleeding noted.   Neurologic: Alert and oriented. Gait is antalgic. Pt uses cane.  no tremors     Psychiatric: Pleasant, calm, appropriate affect.    A complete 10 system review of systems was performed and is negative except as mentioned in the HPI or below:  General: WNL  Eyes: WNL  Ears/Nose/Throat: Hearing Loss  Lungs: WNL  Heart: Irregular Heartbeat, Fainting  Stomach: Abdominal Pain  Bladder: WNL  Muscle/Joints: WNL  Skin: WNL  Nervous System: Dizziness, Loss of Balance (balance problems.)  Mental Health: Depression     Blood: WNL       Past History   Past Medical History:   Past Medical History:   Diagnosis Date   ? Depression    ? Essential tremor    ? History of closed head injury    ? Hypertension    ? Hypertrophic cardiomyopathy    ? Nephrolithiasis    ? Tobacco dependence        Past Surgical History:   Past Surgical History:   Procedure Laterality Date   ? APPENDECTOMY     ? KIDNEY STONE SURGERY         Family History:   Family History   Problem Relation Age of Onset   ? Asperger's syndrome Son    ? Other Daughter      opioid dependency   ? Cancer Mother      probable colon   ? Prostate cancer Father    no family history of sudden cardiac death, sustained arrhythmias, or HOCM.    Social History:   Social History     Social History   ? Marital status:      Spouse name: N/A   ? Number of children: 2   ? Years of education: N/A     Occupational History   ?       Social History Main Topics   ? Smoking status: Current Every Day Smoker   ? Smokeless tobacco: Never Used   ? Alcohol use No   ? Drug use: No   ? Sexual activity: Not on file     Other Topics Concern   ? Not on file     Social History Narrative    Semi- retired, manages apartment             Lab Results    Chemistry/lipid CBC Cardiac Enzymes/BNP/TSH/INR   Lab Results   Component Value Date    CHOL 262 (H) 11/03/2014     11/03/2014    LDLCALC 122 11/03/2014    TRIG 67 11/03/2014    CREATININE 0.80 05/01/2018    BUN 21 05/01/2018    K 4.5 05/01/2018     05/01/2018     05/01/2018    CO2 23  05/01/2018    Lab Results   Component Value Date    WBC 7.9 05/01/2018    HGB 15.7 05/01/2018    HCT 45.2 05/01/2018    MCV 91 05/01/2018     05/01/2018    Lab Results   Component Value Date    TSH 1.75 05/01/2018    INR 1.04 05/14/2013          Ha Way MD  Non-invasive Cardiology  [facility]

## 2021-11-19 DIAGNOSIS — R39.15 URGENCY OF URINATION: ICD-10-CM

## 2021-11-19 DIAGNOSIS — F33.41 RECURRENT MAJOR DEPRESSIVE DISORDER, IN PARTIAL REMISSION (H): Primary | ICD-10-CM

## 2021-11-19 NOTE — TELEPHONE ENCOUNTER
Reason for Call:  Other call back    Detailed comments: pt out of medications for:  Lisinopril  Escitalopram    Pharm:  [facility] - [address]      Phone Number Patient can be reached at: Cell number on file:    Telephone Information:   Mobile [phone]       Best Time: anytime    Can we leave a detailed message on this number? YES    Call taken on 11/19/2021 at 3:19 PM by Kylee Torres

## 2021-11-20 NOTE — TELEPHONE ENCOUNTER
"Routing refill request to provider for review/approval because:  BP check is overdue  Creatinine and potassium overdue  Due to medication information not transferring due to [facility] please review the medication information prior to signing to ensure accuracy.    Last written Rxs:             Last office visit provider:  1/13/21    Requested Prescriptions   Pending Prescriptions Disp Refills     lisinopril (ZESTRIL) 20 MG tablet 90 tablet 3     Sig: Take 1 tablet (20 mg) by mouth daily       ACE Inhibitors (Including Combos) Protocol Failed - 11/20/2021  7:30 AM        Failed - Blood pressure under 140/90 in past 12 months     BP Readings from Last 3 Encounters:   06/03/12 160/80                 Failed - Normal serum creatinine on file in past 12 months     Recent Labs   Lab Test 06/06/18  1421   CR 0.9       Ok to refill medication if creatinine is low          Failed - Normal serum potassium on file in past 12 months     Recent Labs   Lab Test 05/01/18  1241   POTASSIUM 4.5             Passed - Recent (12 mo) or future (30 days) visit within the authorizing provider's specialty     Patient has had an office visit with the authorizing provider or a provider within the authorizing providers department within the previous 12 mos or has a future within next 30 days. See \"Patient Info\" tab in inbasket, or \"Choose Columns\" in Meds & Orders section of the refill encounter.              Passed - Medication is active on med list        Passed - Patient is age 18 or older           escitalopram (LEXAPRO) 20 MG tablet 90 tablet 3     Sig: Take 1 tablet (20 mg) by mouth daily       SSRIs Protocol Failed - 11/20/2021  7:30 AM        Failed - Medication is active on med list        Passed - Recent (12 mo) or future (30 days) visit within the authorizing provider's specialty     Patient has had an office visit with the authorizing provider or a provider within the authorizing providers department within the previous 12 mos or has a " "future within next 30 days. See \"Patient Info\" tab in inbasket, or \"Choose Columns\" in Meds & Orders section of the refill encounter.              Passed - Patient is age 18 or older             Diana Medina RN 11/20/21 7:57 AM  "

## 2021-11-22 RX ORDER — ESCITALOPRAM OXALATE 20 MG/1
20 TABLET ORAL DAILY
Qty: 90 TABLET | Refills: 3 | Status: SHIPPED | OUTPATIENT
Start: 2021-11-22 | End: 2022-01-01

## 2021-11-22 RX ORDER — LISINOPRIL 20 MG/1
20 TABLET ORAL DAILY
Qty: 90 TABLET | Refills: 3 | Status: SHIPPED | OUTPATIENT
Start: 2021-11-22 | End: 2022-01-01

## 2021-12-23 DIAGNOSIS — I10 ESSENTIAL HYPERTENSION: Primary | ICD-10-CM

## 2021-12-27 PROBLEM — I48.0 PAROXYSMAL ATRIAL FIBRILLATION (H): Status: ACTIVE | Noted: 2021-06-23

## 2021-12-27 PROBLEM — F17.200 CURRENT EVERY DAY SMOKER: Status: ACTIVE | Noted: 2021-06-23

## 2021-12-27 PROBLEM — I10 ESSENTIAL HYPERTENSION: Status: ACTIVE | Noted: 2021-06-23

## 2021-12-27 RX ORDER — METOPROLOL TARTRATE 50 MG
50 TABLET ORAL 2 TIMES DAILY
Qty: 90 TABLET | Refills: 3 | Status: SHIPPED | OUTPATIENT
Start: 2021-12-27 | End: 2022-08-15

## 2022-01-01 DIAGNOSIS — R39.15 URGENCY OF URINATION: ICD-10-CM

## 2022-01-01 DIAGNOSIS — F33.41 RECURRENT MAJOR DEPRESSIVE DISORDER, IN PARTIAL REMISSION (H): ICD-10-CM

## 2022-01-01 RX ORDER — ESCITALOPRAM OXALATE 20 MG/1
20 TABLET ORAL DAILY
Qty: 90 TABLET | Refills: 0 | Status: SHIPPED | OUTPATIENT
Start: 2022-01-01 | End: 2023-01-01

## 2022-01-01 RX ORDER — LISINOPRIL 20 MG/1
20 TABLET ORAL DAILY
Qty: 90 TABLET | Refills: 0 | Status: SHIPPED | OUTPATIENT
Start: 2022-01-01 | End: 2023-01-01

## 2022-08-15 DIAGNOSIS — I10 ESSENTIAL HYPERTENSION: ICD-10-CM

## 2022-08-15 RX ORDER — METOPROLOL TARTRATE 50 MG
50 TABLET ORAL 2 TIMES DAILY
Qty: 90 TABLET | Refills: 3 | Status: SHIPPED | OUTPATIENT
Start: 2022-08-15 | End: 2023-01-01

## 2022-08-15 NOTE — TELEPHONE ENCOUNTER
"Routing refill request to provider for review/approval because:  Patient needs to be seen because it has been more than 1 year since last office visit - multiple hospital admissions. /98 on 10/01/21 in ED.    Last Written Prescription Date:  12/27/21  Last Fill Quantity: 90,  # refills: 3   Last office visit provider:  1/13/21 -  Multiple hospital admissions    Requested Prescriptions   Pending Prescriptions Disp Refills     metoprolol tartrate (LOPRESSOR) 50 MG tablet 90 tablet 3     Sig: Take 1 tablet (50 mg) by mouth 2 times daily       Beta-Blockers Protocol Failed - 8/15/2022 12:48 PM        Failed - Blood pressure under 140/90 in past 12 months     BP Readings from Last 3 Encounters:   06/03/12 160/80                 Failed - Recent (12 mo) or future (30 days) visit within the authorizing provider's specialty     Patient has had an office visit with the authorizing provider or a provider within the authorizing providers department within the previous 12 mos or has a future within next 30 days. See \"Patient Info\" tab in inbasket, or \"Choose Columns\" in Meds & Orders section of the refill encounter.              Passed - Patient is age 6 or older        Passed - Medication is active on med list             NATHANAEL OROURKE RN 08/15/22 12:57 PM  "

## 2022-11-18 NOTE — TELEPHONE ENCOUNTER
"Routing refill request to provider for review/approval because:  BP not current  PHQ 9 score - not on file/out of date.  Patient needs to be seen because it has been more than 1 year since last office visit.    Last Written Prescription Date:  11/22/21  Last Fill Quantity: 90,  # refills: 3   Last office visit provider:  1/13/21     Requested Prescriptions   Pending Prescriptions Disp Refills     escitalopram (LEXAPRO) 20 MG tablet 90 tablet 3     Sig: Take 1 tablet (20 mg) by mouth daily       SSRIs Protocol Failed - 11/18/2022 10:52 AM        Failed - PHQ-9 score less than 5 in past 6 months     Please review last PHQ-9 score.           Failed - Recent (6 mo) or future (30 days) visit within the authorizing provider's specialty     Patient had office visit in the last 6 months or has a visit in the next 30 days with authorizing provider or within the authorizing provider's specialty.  See \"Patient Info\" tab in inbasket, or \"Choose Columns\" in Meds & Orders section of the refill encounter.            Passed - Medication is active on med list        Passed - Patient is age 18 or older           lisinopril (ZESTRIL) 20 MG tablet 90 tablet 3     Sig: Take 1 tablet (20 mg) by mouth daily       ACE Inhibitors (Including Combos) Protocol Failed - 11/17/2022  9:06 AM        Failed - Blood pressure under 140/90 in past 12 months     BP Readings from Last 3 Encounters:   06/03/12 160/80                 Failed - Recent (12 mo) or future (30 days) visit within the authorizing provider's specialty     Patient has had an office visit with the authorizing provider or a provider within the authorizing providers department within the previous 12 mos or has a future within next 30 days. See \"Patient Info\" tab in inbasket, or \"Choose Columns\" in Meds & Orders section of the refill encounter.              Failed - Normal serum creatinine on file in past 12 months     Recent Labs   Lab Test 06/06/18  1421   CR 0.9       Ok to refill " medication if creatinine is low          Failed - Normal serum potassium on file in past 12 months     Recent Labs   Lab Test 05/01/18  1241   POTASSIUM 4.5             Passed - Medication is active on med list        Passed - Patient is age 18 or older             Jose Bacon RN 11/18/22 10:53 AM

## 2023-01-01 ENCOUNTER — TELEPHONE (OUTPATIENT)
Dept: INTERNAL MEDICINE | Facility: CLINIC | Age: 68
End: 2023-01-01
Payer: COMMERCIAL

## 2023-01-01 ENCOUNTER — TELEPHONE (OUTPATIENT)
Dept: PHARMACY | Facility: OTHER | Age: 68
End: 2023-01-01
Payer: COMMERCIAL

## 2023-01-01 ENCOUNTER — VIRTUAL VISIT (OUTPATIENT)
Dept: INTERNAL MEDICINE | Facility: CLINIC | Age: 68
End: 2023-01-01
Payer: COMMERCIAL

## 2023-01-01 ENCOUNTER — TELEPHONE (OUTPATIENT)
Dept: INTERNAL MEDICINE | Facility: CLINIC | Age: 68
End: 2023-01-01

## 2023-01-01 ENCOUNTER — MEDICAL CORRESPONDENCE (OUTPATIENT)
Dept: HEALTH INFORMATION MANAGEMENT | Facility: CLINIC | Age: 68
End: 2023-01-01

## 2023-01-01 ENCOUNTER — MEDICAL CORRESPONDENCE (OUTPATIENT)
Dept: HEALTH INFORMATION MANAGEMENT | Facility: CLINIC | Age: 68
End: 2023-01-01
Payer: COMMERCIAL

## 2023-01-01 ENCOUNTER — NURSE TRIAGE (OUTPATIENT)
Dept: NURSING | Facility: CLINIC | Age: 68
End: 2023-01-01
Payer: COMMERCIAL

## 2023-01-01 ENCOUNTER — TELEPHONE (OUTPATIENT)
Dept: NURSING | Facility: CLINIC | Age: 68
End: 2023-01-01
Payer: COMMERCIAL

## 2023-01-01 ENCOUNTER — HEALTH MAINTENANCE LETTER (OUTPATIENT)
Age: 68
End: 2023-01-01

## 2023-01-01 ENCOUNTER — APPOINTMENT (OUTPATIENT)
Dept: INTERNAL MEDICINE | Facility: CLINIC | Age: 68
End: 2023-01-01
Payer: COMMERCIAL

## 2023-01-01 DIAGNOSIS — I50.32 CHRONIC DIASTOLIC HEART FAILURE (H): ICD-10-CM

## 2023-01-01 DIAGNOSIS — I10 ESSENTIAL HYPERTENSION: Primary | ICD-10-CM

## 2023-01-01 DIAGNOSIS — F33.41 RECURRENT MAJOR DEPRESSIVE DISORDER, IN PARTIAL REMISSION (H): ICD-10-CM

## 2023-01-01 DIAGNOSIS — G47.00 INSOMNIA, UNSPECIFIED TYPE: ICD-10-CM

## 2023-01-01 DIAGNOSIS — Z79.01 CHRONIC ANTICOAGULATION: ICD-10-CM

## 2023-01-01 DIAGNOSIS — Z53.9 DIAGNOSIS NOT YET DEFINED: Primary | ICD-10-CM

## 2023-01-01 DIAGNOSIS — I10 ESSENTIAL HYPERTENSION: ICD-10-CM

## 2023-01-01 DIAGNOSIS — F32.A DEPRESSION, UNSPECIFIED DEPRESSION TYPE: ICD-10-CM

## 2023-01-01 DIAGNOSIS — I48.91 ATRIAL FIBRILLATION WITH RVR (H): ICD-10-CM

## 2023-01-01 DIAGNOSIS — Z12.11 SCREEN FOR COLON CANCER: Primary | ICD-10-CM

## 2023-01-01 DIAGNOSIS — F17.200 TOBACCO DEPENDENCE: ICD-10-CM

## 2023-01-01 DIAGNOSIS — Z86.73 HISTORY OF CVA (CEREBROVASCULAR ACCIDENT): ICD-10-CM

## 2023-01-01 DIAGNOSIS — I42.2 HYPERTROPHIC NONOBSTRUCTIVE CARDIOMYOPATHY (H): ICD-10-CM

## 2023-01-01 PROCEDURE — 99214 OFFICE O/P EST MOD 30 MIN: CPT | Mod: 93 | Performed by: INTERNAL MEDICINE

## 2023-01-01 PROCEDURE — G0180 MD CERTIFICATION HHA PATIENT: HCPCS | Performed by: INTERNAL MEDICINE

## 2023-01-01 PROCEDURE — 99207 PR MD CERTIFICATION HHA PATIENT: CPT | Performed by: INTERNAL MEDICINE

## 2023-01-01 PROCEDURE — 96127 BRIEF EMOTIONAL/BEHAV ASSMT: CPT | Mod: 93 | Performed by: INTERNAL MEDICINE

## 2023-01-01 RX ORDER — METOPROLOL TARTRATE 100 MG
200 TABLET ORAL 2 TIMES DAILY
Qty: 360 TABLET | Refills: 3 | Status: SHIPPED | OUTPATIENT
Start: 2023-01-01

## 2023-01-01 RX ORDER — FOLIC ACID 1 MG/1
1 TABLET ORAL DAILY
COMMUNITY

## 2023-01-01 RX ORDER — APIXABAN 5 MG/1
5 TABLET, FILM COATED ORAL 2 TIMES DAILY
Qty: 180 TABLET | Refills: 3 | Status: SHIPPED | OUTPATIENT
Start: 2023-01-01

## 2023-01-01 RX ORDER — TRAZODONE HYDROCHLORIDE 50 MG/1
TABLET, FILM COATED ORAL
COMMUNITY
Start: 2023-01-01 | End: 2023-01-01

## 2023-01-01 RX ORDER — ESCITALOPRAM OXALATE 20 MG/1
20 TABLET ORAL DAILY
Qty: 90 TABLET | Refills: 3 | Status: SHIPPED | OUTPATIENT
Start: 2023-01-01

## 2023-01-01 RX ORDER — ROSUVASTATIN CALCIUM 20 MG/1
20 TABLET, COATED ORAL DAILY
Qty: 90 TABLET | Refills: 3 | Status: SHIPPED | OUTPATIENT
Start: 2023-01-01 | End: 2024-07-13

## 2023-01-01 RX ORDER — LOPERAMIDE HCL 2 MG
2 CAPSULE ORAL
COMMUNITY
Start: 2023-01-01

## 2023-01-01 RX ORDER — METOPROLOL TARTRATE 50 MG
50 TABLET ORAL 2 TIMES DAILY
Qty: 90 TABLET | Refills: 3 | OUTPATIENT
Start: 2023-01-01

## 2023-01-01 RX ORDER — METOPROLOL TARTRATE 50 MG
50 TABLET ORAL 2 TIMES DAILY
Qty: 90 TABLET | Refills: 3 | Status: CANCELLED | OUTPATIENT
Start: 2023-01-01

## 2023-01-01 RX ORDER — CIPROFLOXACIN 500 MG/1
TABLET, FILM COATED ORAL
COMMUNITY
Start: 2023-01-01 | End: 2023-01-01

## 2023-01-01 RX ORDER — ACETAMINOPHEN 500 MG
500-1000 TABLET ORAL
COMMUNITY
Start: 2023-01-01

## 2023-01-01 RX ORDER — CIPROFLOXACIN 500 MG/1
500 TABLET, FILM COATED ORAL EVERY 12 HOURS
COMMUNITY
Start: 2023-01-01 | End: 2023-01-01

## 2023-01-01 RX ORDER — ROSUVASTATIN CALCIUM 20 MG/1
20 TABLET, COATED ORAL DAILY
COMMUNITY
Start: 2023-01-01 | End: 2023-01-01

## 2023-01-01 RX ORDER — APIXABAN 5 MG/1
5 TABLET, FILM COATED ORAL 2 TIMES DAILY
COMMUNITY
Start: 2023-01-01 | End: 2023-01-01

## 2023-01-01 RX ORDER — LISINOPRIL 5 MG/1
5 TABLET ORAL DAILY
COMMUNITY
Start: 2023-01-01 | End: 2023-01-01

## 2023-01-01 RX ORDER — LISINOPRIL 2.5 MG/1
TABLET ORAL
COMMUNITY
Start: 2023-01-01 | End: 2023-01-01

## 2023-01-01 RX ORDER — TRAZODONE HYDROCHLORIDE 50 MG/1
25 TABLET, FILM COATED ORAL AT BEDTIME
Qty: 45 TABLET | Refills: 0 | Status: CANCELLED | OUTPATIENT
Start: 2023-01-01

## 2023-01-01 RX ORDER — METOPROLOL TARTRATE 50 MG
50 TABLET ORAL 2 TIMES DAILY
Qty: 90 TABLET | Refills: 3 | Status: SHIPPED | OUTPATIENT
Start: 2023-01-01

## 2023-01-01 RX ORDER — POTASSIUM CHLORIDE 1500 MG/1
20 TABLET, EXTENDED RELEASE ORAL 2 TIMES DAILY
Qty: 180 TABLET | Refills: 3 | Status: SHIPPED | OUTPATIENT
Start: 2023-01-01 | End: 2024-07-13

## 2023-01-01 RX ORDER — ESCITALOPRAM OXALATE 20 MG/1
TABLET ORAL
Qty: 90 TABLET | Refills: 0 | Status: SHIPPED | OUTPATIENT
Start: 2023-01-01 | End: 2023-01-01

## 2023-01-01 RX ORDER — TRAZODONE HYDROCHLORIDE 50 MG/1
25 TABLET, FILM COATED ORAL
COMMUNITY
Start: 2023-01-01 | End: 2023-01-01

## 2023-01-01 RX ORDER — OXYBUTYNIN CHLORIDE 5 MG/1
TABLET ORAL
COMMUNITY
Start: 2023-01-01

## 2023-01-01 RX ORDER — METOPROLOL TARTRATE 50 MG
50 TABLET ORAL 2 TIMES DAILY
Qty: 90 TABLET | Refills: 3 | Status: SHIPPED | OUTPATIENT
Start: 2023-01-01 | End: 2023-01-01

## 2023-01-01 RX ORDER — POTASSIUM CHLORIDE 1500 MG/1
20 TABLET, EXTENDED RELEASE ORAL 2 TIMES DAILY
COMMUNITY
Start: 2023-01-01 | End: 2023-01-01

## 2023-01-01 RX ORDER — FUROSEMIDE 40 MG
1 TABLET ORAL DAILY
COMMUNITY
Start: 2023-01-01

## 2023-01-01 RX ORDER — OXYBUTYNIN CHLORIDE 5 MG/1
5 TABLET ORAL
COMMUNITY
Start: 2023-01-01 | End: 2024-06-27

## 2023-01-01 RX ORDER — LANOLIN ALCOHOL/MO/W.PET/CERES
100 CREAM (GRAM) TOPICAL DAILY
COMMUNITY

## 2023-01-01 ASSESSMENT — PATIENT HEALTH QUESTIONNAIRE - PHQ9
SUM OF ALL RESPONSES TO PHQ QUESTIONS 1-9: 9
SUM OF ALL RESPONSES TO PHQ QUESTIONS 1-9: 8
10. IF YOU CHECKED OFF ANY PROBLEMS, HOW DIFFICULT HAVE THESE PROBLEMS MADE IT FOR YOU TO DO YOUR WORK, TAKE CARE OF THINGS AT HOME, OR GET ALONG WITH OTHER PEOPLE: VERY DIFFICULT
SUM OF ALL RESPONSES TO PHQ QUESTIONS 1-9: 8

## 2023-02-17 NOTE — TELEPHONE ENCOUNTER
"Routing refill request to provider for review/approval because:  phq 9  Due to be seen    Last Written Prescription Date:  11/18/22  Last Fill Quantity: 90,  # refills: 0   Last office visit provider:  1/13/21     Requested Prescriptions   Pending Prescriptions Disp Refills     escitalopram (LEXAPRO) 20 MG tablet [Pharmacy Med Name: ESCITALOPRAM 20MG TABLETS] 90 tablet 0     Sig: TAKE 1 TABLET(20 MG) BY MOUTH DAILY       SSRIs Protocol Failed - 2/16/2023  6:11 AM        Failed - PHQ-9 score less than 5 in past 6 months     Please review last PHQ-9 score.           Failed - Recent (6 mo) or future (30 days) visit within the authorizing provider's specialty     Patient had office visit in the last 6 months or has a visit in the next 30 days with authorizing provider or within the authorizing provider's specialty.  See \"Patient Info\" tab in inbasket, or \"Choose Columns\" in Meds & Orders section of the refill encounter.            Passed - Medication is active on med list        Passed - Patient is age 18 or older             Malika Ennis RN 02/17/23 3:34 PM  "

## 2023-03-01 NOTE — TELEPHONE ENCOUNTER
General Call    Contacts       Type Contact Phone/Fax    03/01/2023 03:22 PM CST Phone (Incoming) Emani (Home Care) [phone]     [facility]        Reason for Call:  Verbal orders    What are your questions or concerns:    PT-work on balance, gait and stregnthening 1 time per for 1 week, 2 times for 5 weeks, 1 time a week for 3.    Home health aid-1 time per week for 4 weeks.    Social work -evaluation    Medication questions. About meds not on the list and maybe needing refills. Changed and updated lots of medications,      Okay to leave a detailed message?: Yes at Other phone number:  [phone]

## 2023-03-03 NOTE — TELEPHONE ENCOUNTER
Fabienne OT with [facility] calling for an order and also to report a patient's fall:     Order requested:  1) OT 1x a week for 2 weeks, then 0 times 2 weeks for 1 week for one week  Dx: Stroke; ADLs safely and falls prevention    Patient fall:   Fall on 3/1/2023 was sitting in the wheel chair and reached for something and fell out of the wheel. No injuries. Was assisted off the floor.     Writer is routing this message to patient's PCP pool to follow up on the above.

## 2023-03-03 NOTE — TELEPHONE ENCOUNTER
The Home Care/Assisted Living/Nursing Facility is calling regarding an established patient.  Has the patient seen Home Care in the past or is currently residing in Assisted Living or Nursing Facility? Yes.     Charis calling from [facility] requesting the following orders that are within the Home Care, Assisted Living or Nursing Home Eval and Treatment standing order and can be signed as standing order signature required by RN.    Preferred Call Back Number: [phone]    PT/OT/Speech Therapy   OT 1x a week for 2 weeks, then 0 times 2 weeks for 1 week for one week  Dx: Stroke; ADLs safely and falls prevention    Any additional Orders:  Are there any orders requested, not stated above, that are outside of the standing order and must be routed to a licensed practitioner for approval?    No    Writer has verified Requestor will send fax to have orders signed.  Left secure voicemail as requested

## 2023-03-03 NOTE — TELEPHONE ENCOUNTER
The Home Care/Assisted Living/Nursing Facility is calling regarding an established patient.  Has the patient seen Home Care in the past or is currently residing in Assisted Living or Nursing Facility? Yes.     Emani calling from [facility] requesting the following orders that are within the Home Care, Assisted Living or Nursing Home Eval and Treatment standing order and can be signed as standing order signature required by RN.    Preferred Call Back Number: [phone]    Home Care Visits Continuation and PT/OT/Speech Therapy   PT-work on balance, gait and stregnthening 1 time per for 1 week, 2 times for 5 weeks, 1 time a week for 3.     Home health aid-1 time per week for 4 weeks.     Social work -evaluation    Any additional Orders:  Are there any orders requested, not stated above, that are outside of the standing order and must be routed to a licensed practitioner for approval?    Yes: continue on 1 mg folic acid and 100 mg thymine supplement?    Writer has verified Requestor will send fax to have orders signed.

## 2023-03-07 NOTE — TELEPHONE ENCOUNTER
General Call      Reason for Call:  Emani mcdonough for updated verbal ordersc    What are your questions or concerns:  Pt is more onboard and they would like to update orders    PT- 2 times for 4 weeks, 1 time a week for 4 weeks.      Okay to leave a detailed message?: Yes at Other phone number:  [phone]

## 2023-03-07 NOTE — TELEPHONE ENCOUNTER
Pt has not seen PCP since 1/13/2021, two years have passed and patient is no longer considered an established patient of Dr. Montero.

## 2023-03-08 PROBLEM — I63.9 ACUTE CVA (CEREBROVASCULAR ACCIDENT) (H): Status: ACTIVE | Noted: 2023-01-01

## 2023-03-08 PROBLEM — I48.91 ATRIAL FIBRILLATION WITH RVR (H): Status: ACTIVE | Noted: 2021-06-23

## 2023-03-08 PROBLEM — I71.21 ASCENDING AORTIC ANEURYSM (H): Status: ACTIVE | Noted: 2021-06-23

## 2023-03-09 NOTE — TELEPHONE ENCOUNTER
Spoke with Emani and informed her that patient will need to be seen for further home care orders to be given.

## 2023-03-09 NOTE — TELEPHONE ENCOUNTER
Spoke with patient and assisted him with scheduling a visit with Dr. Montero on Monday next week 3/13/23.

## 2023-03-20 NOTE — TELEPHONE ENCOUNTER
March 20, 2023    [facility] orders was received via fax for Dr. Montero to sign.  Patient label was attached to paperwork and placed in provider's inbox to be signed.    Talon Zamora III

## 2023-03-22 NOTE — TELEPHONE ENCOUNTER
Order/Referral Request    Who is requesting: JACQUELIN Joaquin    Orders being requested:   Verbal Order:  Nursing will be seeing him on Friday, Pt want to reassess him in one month.    Need a Bath aide once a week for 5 weeks.       Okay to leave a detailed message?: Yes at Other phone number:      Emani - [phone]

## 2023-03-22 NOTE — TELEPHONE ENCOUNTER
March 22, 2023    Home health orders was picked up from outbox of Dr. Montero.  Paperwork has been reviewed and is complete.  Per initial initial request, this was sent via fax to [phone].     Talon Zamora III

## 2023-03-24 NOTE — TELEPHONE ENCOUNTER
The Home Care/Assisted Living/Nursing Facility is calling regarding an established patient.  Has the patient seen Home Care in the past or is currently residing in Assisted Living or Nursing Facility? Yes.     Emani calling from [facility] requesting the following orders that are within the Home Care, Assisted Living or Nursing Home Eval and Treatment standing order and can be signed as standing order signature required by RN.    Preferred Call Back Number: [phone]    PT/OT/Speech Therapy   and Bath aide  Any additional Orders:  Are there any orders requested, not stated above, that are outside of the standing order and must be routed to a licensed practitioner for approval?    No    Writer has verified Requestor will send fax to have orders signed.    Verbal orders given to Emani

## 2023-03-27 NOTE — TELEPHONE ENCOUNTER
Order/Referral Request    Who is requesting: Fany    Orders being requested: Nursing frequency order, 1 visit every other week for 5 weeks.    Reason service is needed/diagnosis: n/a    When are orders needed by: ASAP    Has this been discussed with Provider: No    Does patient have a preference on a Group/Provider/Facility? [facility]    Does patient have an appointment scheduled?: No    Where to send orders: Call Radha at [phone]    Okay to leave a detailed message?: Yes at Other phone number:  [phone]

## 2023-03-28 NOTE — TELEPHONE ENCOUNTER
The Home Care/Assisted Living/Nursing Facility is calling regarding an established patient.  Has the patient seen Home Care in the past or is currently residing in Assisted Living or Nursing Facility? Yes.     Radha calling from [facility] requesting the following orders that are within the Home Care, Assisted Living or Nursing Home Eval and Treatment standing order and can be signed as standing order signature required by RN.    Preferred Call Back Number: [phone]    Nursing frequency order, 1 visit every other week for 5 weeks.       Any additional Orders:  Are there any orders requested, not stated above, that are outside of the standing order and must be routed to a licensed practitioner for approval?    No    Writer has verified Requestor will send fax to have orders signed.    Verbal orders given to Radha. Patient has appt. With Dr. Montero 03/29/23.

## 2023-03-29 PROBLEM — I10 ESSENTIAL HYPERTENSION: Status: RESOLVED | Noted: 2021-06-23 | Resolved: 2023-01-01

## 2023-03-29 PROBLEM — Z79.01 CHRONIC ANTICOAGULATION: Status: ACTIVE | Noted: 2023-01-01

## 2023-03-29 PROBLEM — L03.317 CELLULITIS OF LEFT BUTTOCK: Status: RESOLVED | Noted: 2023-01-01 | Resolved: 2023-01-01

## 2023-03-29 PROBLEM — I63.9 ACUTE CVA (CEREBROVASCULAR ACCIDENT) (H): Status: RESOLVED | Noted: 2023-01-01 | Resolved: 2023-01-01

## 2023-03-29 PROBLEM — Z86.73 HISTORY OF CVA (CEREBROVASCULAR ACCIDENT): Status: ACTIVE | Noted: 2023-01-01

## 2023-03-29 PROBLEM — I10 ESSENTIAL HYPERTENSION: Status: ACTIVE | Noted: 2023-01-01

## 2023-03-29 PROBLEM — I69.90 LATE EFFECTS OF CVA (CEREBROVASCULAR ACCIDENT): Status: ACTIVE | Noted: 2023-01-01

## 2023-03-29 PROBLEM — U07.1 COVID-19: Status: RESOLVED | Noted: 2023-01-01 | Resolved: 2023-01-01

## 2023-03-29 PROBLEM — N39.0 E. COLI UTI (URINARY TRACT INFECTION): Status: ACTIVE | Noted: 2023-01-01

## 2023-03-29 PROBLEM — I50.32 CHRONIC DIASTOLIC HEART FAILURE (H): Status: ACTIVE | Noted: 2023-01-01

## 2023-03-29 PROBLEM — I50.9 ACUTE ON CHRONIC CONGESTIVE HEART FAILURE (H): Status: ACTIVE | Noted: 2023-01-01

## 2023-03-29 PROBLEM — G31.9 CEREBELLAR DEGENERATION (H): Chronic | Status: RESOLVED | Noted: 2018-05-30 | Resolved: 2023-01-01

## 2023-03-29 PROBLEM — N39.0 E. COLI UTI (URINARY TRACT INFECTION): Status: RESOLVED | Noted: 2023-01-01 | Resolved: 2023-01-01

## 2023-03-29 PROBLEM — B96.20 E. COLI UTI (URINARY TRACT INFECTION): Status: RESOLVED | Noted: 2023-01-01 | Resolved: 2023-01-01

## 2023-03-29 PROBLEM — U07.1 COVID-19: Status: ACTIVE | Noted: 2023-01-01

## 2023-03-29 PROBLEM — G31.9 CEREBELLAR DEGENERATION (H): Chronic | Status: ACTIVE | Noted: 2018-05-30

## 2023-03-29 PROBLEM — B96.20 E. COLI UTI (URINARY TRACT INFECTION): Status: ACTIVE | Noted: 2023-01-01

## 2023-03-29 PROBLEM — I50.9 ACUTE ON CHRONIC CONGESTIVE HEART FAILURE (H): Status: RESOLVED | Noted: 2023-01-01 | Resolved: 2023-01-01

## 2023-03-29 PROBLEM — L03.317 CELLULITIS OF LEFT BUTTOCK: Status: ACTIVE | Noted: 2023-01-01

## 2023-03-29 NOTE — PROGRESS NOTES
Rich is a 67 year old who is being evaluated via a telephone visit.      How would you like to obtain your AVS? Mail a copy  If the video visit is dropped, the invitation should be resent by: Text to cell phone: [phone]  Will anyone else be joining your video visit? Yes, daughter    Assessment & Plan     Essential hypertension  Ongoing treatment.     Hypertrophic nonobstructive cardiomyopathy  Being investigated further.     Chronic anticoagulation  Abixiban.     History of CVA (cerebrovascular accident)  An episode of dysphasia while in hospital.  He had acute thrombotic therapy. No further symptoms.      Tobacco dependence  Still smoking some!  Urged to quit.      Chronic diastolic heart failure   Clinically he has had acute diastolic failure related to atrial fibrillation and his cardiomyopathy, now compensated.  They plan to explore for CAD    Atrial fibrillation with RVR   Rate controlled now.  Ongoing anticoagulation.  Cardiology to possibly consider cardioversion.     Cerebellar degeneration  Still using some alcohol!  Urged to quit all alcohol.     FOLLOW UP PLANS see me in office after planned cardiac care - 1-2 months.     Mason Montero MD  [facility]    Subjective   Rich is a 67 year old, presenting for the following health issues:    Additional Questions 3/29/2023   Roomed by Ashly   Accompanied by Daughter     Forms 3/29/2023   Any forms needing to be completed Yes     HPI  At home now.  Will be having coronary angiography to r/o CAD in a few days at [facility].  Has SANFORD, which improved with diuresis, some mild cough.  No chest pain.  No fever, or nausea or bowel dysfunction.  Nocturia.  Hesitancy.  No ankle swelling.  Modest alcohol.     Objective    He is alert and oriented in NAD.  No chest pain, and minimal cough. Nocturia times 3.     Telephone time:  15 minutes

## 2023-03-30 NOTE — TELEPHONE ENCOUNTER
General Call    Contacts       Type Contact Phone/Fax    03/30/2023 09:18 AM CDT Phone (Incoming) Fabienne (Home Care) [phone]     [facility]        Reason for Call:  Verbal orders    What are your questions or concerns:    OT-1 time for 1 week,  1 time every 4 weeks for 4 weeks.       Okay to leave a detailed message?: Yes at Other phone number:  [phone]

## 2023-03-30 NOTE — TELEPHONE ENCOUNTER
The Home Care/Assisted Living/Nursing Facility is calling regarding an established patient.  Has the patient seen Home Care in the past or is currently residing in Assisted Living or Nursing Facility? Yes.     Fabienne calling from [facility] requesting the following orders that are within the Home Care, Assisted Living or Nursing Home Eval and Treatment standing order and can be signed as standing order signature required by RN.    Preferred Call Back Number: [phone]    PT/OT/Speech Therapy    Any additional Orders:  Are there any orders requested, not stated above, that are outside of the standing order and must be routed to a licensed practitioner for approval?    No    Writer has verified Requestor will send fax to have orders signed.

## 2023-03-30 NOTE — TELEPHONE ENCOUNTER
March 30, 2023    Home health orders was received via fax for Dr. Montero to sign.  Patient label was attached to paperwork and placed in provider's inbox to be signed.    Kylee Torres

## 2023-03-31 NOTE — TELEPHONE ENCOUNTER
March 31, 2023    Home health orders was received via fax for Dr. Montero to sign.  Patient label was attached to paperwork and placed in provider's inbox to be signed.    Kylee Torres

## 2023-03-31 NOTE — TELEPHONE ENCOUNTER
March 31, 2023    Home health orders was picked up from outbox of Dr. Montero.  Paperwork has been reviewed and is complete.  Per initial initial request, this was sent via fax to [phone].     Kylee Torres

## 2023-03-31 NOTE — TELEPHONE ENCOUNTER
March 31, 2023    Home health orders was received via fax for Dr. Toledo to sign.  Patient label was attached to paperwork and placed in provider's inbox to be signed.    Kylee Torres

## 2023-04-04 NOTE — TELEPHONE ENCOUNTER
April 4, 2023    [facility] orders was picked up from outbox of Dr. Montero.  Paperwork has been reviewed and is complete.  Per initial initial request, this was sent via fax to [phone].     Eboni De La Rosa

## 2023-04-11 NOTE — TELEPHONE ENCOUNTER
"Refused refill request for metoprolol as there should already be a refill on file. Filled 8/15/2022 disp 90 with 3 refills      Routing refill request to provider for review/approval because:  Labs not current:  CR, PLT, Weight recording  Medication is reported/historical      Last office visit provider:  3/29/2023     Requested Prescriptions   Pending Prescriptions Disp Refills     ELIQUIS ANTICOAGULANT 5 MG tablet       Sig: Take 1 tablet (5 mg) by mouth 2 times daily       Direct Oral Anticoagulant Agents Failed - 4/11/2023 11:24 AM        Failed - Normal Platelets on file in past 12 months     Recent Labs   Lab Test 05/01/18  1241                  Failed - Serum creatinine less than or equal to 1.4 on file in past 12 mos     Recent Labs   Lab Test 06/06/18  1421   CR 0.9       Ok to refill medication if creatinine is low          Failed - Weight is greater than 60 kg for the past year     Wt Readings from Last 3 Encounters:   09/12/18 92.7 kg (204 lb 4.8 oz)   06/19/18 87.1 kg (192 lb)   05/30/18 88.5 kg (195 lb)             Passed - Medication is active on med list        Passed - Patient is 18-79 years of age        Passed - Recent (6 mo) or future (30 days) visit within the authorizing provider's specialty         Refused Prescriptions Disp Refills     metoprolol tartrate (LOPRESSOR) 50 MG tablet 90 tablet 3     Sig: Take 1 tablet (50 mg) by mouth 2 times daily       Beta-Blockers Protocol Passed - 4/11/2023 11:24 AM        Passed - Blood pressure under 140/90 in past 12 months     BP Readings from Last 3 Encounters:   06/03/12 160/80                 Passed - Patient is age 6 or older        Passed - Recent (12 mo) or future (30 days) visit within the authorizing provider's specialty     Patient has had an office visit with the authorizing provider or a provider within the authorizing providers department within the previous 12 mos or has a future within next 30 days. See \"Patient Info\" tab in " "inbasket, or \"Choose Columns\" in Meds & Orders section of the refill encounter.              Passed - Medication is active on med list             Susannah Rust RN 04/11/23 11:26 AM  "

## 2023-04-12 NOTE — TELEPHONE ENCOUNTER
April 12, 2023    [facility] orders was picked up from outbox of Dr. Toledo.  Paperwork has been reviewed and is complete.  Per initial initial request, this was sent via fax to [phone].     Talon Zamora III

## 2023-04-13 NOTE — TELEPHONE ENCOUNTER
"  Last Written Prescription Date:  08/15/2022  Last Fill Quantity: 90,  # refills: 3   Last office visit provider:  03/09/2023     Requested Prescriptions   Pending Prescriptions Disp Refills     metoprolol tartrate (LOPRESSOR) 50 MG tablet 90 tablet 3     Sig: Take 1 tablet (50 mg) by mouth 2 times daily       Beta-Blockers Protocol Passed - 4/13/2023 12:28 PM        Passed - Blood pressure under 140/90 in past 12 months     BP Readings from Last 3 Encounters:   06/03/12 160/80                 Passed - Patient is age 6 or older        Passed - Recent (12 mo) or future (30 days) visit within the authorizing provider's specialty     Patient has had an office visit with the authorizing provider or a provider within the authorizing providers department within the previous 12 mos or has a future within next 30 days. See \"Patient Info\" tab in inbasket, or \"Choose Columns\" in Meds & Orders section of the refill encounter.              Passed - Medication is active on med list             Abiola Sommer RN 04/13/23 12:28 PM  "

## 2023-04-14 NOTE — TELEPHONE ENCOUNTER
The Home Care/Assisted Living/Nursing Facility is calling regarding an established patient.  Has the patient seen Home Care in the past or is currently residing in Assisted Living or Nursing Facility? Yes.     Charis calling from [facility] requesting the following orders that are within the Home Care, Assisted Living or Nursing Home Eval and Treatment standing order and can be signed as standing order signature required by RN.    Preferred Call Back Number: [phone]    PT/OT/Speech Therapy    Any additional Orders:  Are there any orders requested, not stated above, that are outside of the standing order and must be routed to a licensed practitioner for approval?    No    Writer has verified Requestor will send fax to have orders signed.    Verbal orders left on secure voicemail.

## 2023-04-14 NOTE — TELEPHONE ENCOUNTER
Order/Referral Request    Who is requesting: [facility]    Orders being requested: Verbal Orders    Reason service is needed/diagnosis: Verbal Orders: Request to move OT assessments from the week of April 24th to the week of April 17th.    When are orders needed by: asap    Has this been discussed with Provider: Yes    Does patient have a preference on a Group/Provider/Facility? [facility]    Does patient have an appointment scheduled?: No    Where to send orders: Verbal Orders    Okay to leave a detailed message?: Yes at Other phone number:  Charis Carrera [facility] - [phone]

## 2023-04-17 NOTE — TELEPHONE ENCOUNTER
April 17, 2023    Home health orders was received via fax for Dr. Montero to sign.  Patient label was attached to paperwork and placed in provider's inbox to be signed.    Eboni De La Rosa

## 2023-04-19 NOTE — TELEPHONE ENCOUNTER
April 19, 2023    [facility] was picked up from outbox of Dr. Montero.  Paperwork has been reviewed and is complete.  Per initial initial request, this was sent via fax to [phone].     Eboni De La Rosa

## 2023-05-06 NOTE — TELEPHONE ENCOUNTER
Refused as there should already be refills on file.  Filled 4/13/2023 disp 90 refills 3  Susannah Rust RN   05/06/23 12:09 PM  [facility] Nurse Advisor

## 2023-05-06 NOTE — TELEPHONE ENCOUNTER
Patient calling states [facility] [address] does not have record of Metoprolol order.  Writer will resend ordered Metoprolol per Dr. Monetro.    Alicia Álvarez RN, [facility] Triage Nurse Advisor    Reason for Disposition    [1] Prescription prescribed recently is not at pharmacy AND [2] triager has access to patient's EMR AND [3] prescription is recorded in the EMR    Protocols used: MEDICATION REFILL AND RENEWAL CALL-A-
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
## 2023-06-06 NOTE — TELEPHONE ENCOUNTER
General Call      Reason for Call:  Confirm Home Care follow ups    What are your questions or concerns:  Tete called to confirm that Dr. Montero will be the provider signing off on home care orders once patient begins home care services.    Date of last appointment with provider: 3/29/23    Okay to leave a detailed message?: Yes at Other phone number:  [phone]

## 2023-06-07 NOTE — TELEPHONE ENCOUNTER
June 7, 2023    Home health orders was received via fax for Dr. Montero to sign.  Patient label was attached to paperwork and placed in provider's inbox to be signed.    Eboni De La Rosa

## 2023-06-08 NOTE — TELEPHONE ENCOUNTER
June 8, 2023    Home health orders was picked up from outbox of Dr. Montero.  Paperwork has been reviewed and is complete.  Per initial initial request, this was sent via fax to [phone].     Eboni De La Rosa

## 2023-06-08 NOTE — TELEPHONE ENCOUNTER
Kyle called again this morning and they have an appointment with the patient today, 6/8/23 at 2pm and [facility] [phone], or [phone]

## 2023-06-08 NOTE — TELEPHONE ENCOUNTER
Home Care is calling [facility]        6/8/2023   Home Care Information   Date of Home Care episode start 6/8/2023   Current following provider Dr Montero   Date provider agreed to follow 6/8/2023   Home Care agency [facility]        Requesting orders from: Mason Montero  Provider is following patient: Yes  Is this a 60-day recertification request?  No    Orders Requested  Will provider follow for home care orders?      Information was gathered and will be sent to provider for review.  RN will contact Home Care with information after provider review.  See below for provider approval.    Scooter Curiel RN
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
## 2023-06-16 NOTE — TELEPHONE ENCOUNTER
Susannah  Nurse at [facility] calling regarding about the situation below. Susannah wants to see if provider has any thoughts on what to do next as she think patient really needs the help. Please advise. [phone]

## 2023-06-17 NOTE — TELEPHONE ENCOUNTER
"Routing refill request to provider for review/approval because:  PHQ 9 - not <5 last visit     Last Written Prescription Date:  2/20/23  Last Fill Quantity: 90,  # refills: 0   Last office visit provider:  3/29/23     Requested Prescriptions   Pending Prescriptions Disp Refills     escitalopram (LEXAPRO) 20 MG tablet 90 tablet 0     Sig: Take 1 tablet (20 mg) by mouth daily       SSRIs Protocol Failed - 6/16/2023 10:09 PM        Failed - PHQ-9 score less than 5 in past 6 months     Please review last PHQ-9 score.           Failed - Recent (6 mo) or future (30 days) visit within the authorizing provider's specialty     Patient had office visit in the last 6 months or has a visit in the next 30 days with authorizing provider or within the authorizing provider's specialty.  See \"Patient Info\" tab in inbasket, or \"Choose Columns\" in Meds & Orders section of the refill encounter.            Passed - Medication is active on med list        Passed - Patient is age 18 or older             Nguyen Harris RN 06/17/23 10:12 AM  "

## 2023-06-17 NOTE — TELEPHONE ENCOUNTER
Call from patient asking for a refill of escitalopram 20 mg.     Preferred pharmacy: Jonathan Cadet RN, BSN  Triage Nurse Advisor

## 2023-06-22 NOTE — TELEPHONE ENCOUNTER
Woke today with lower abdomen distended.  Also had a coughing spell while still laying productive of thin liquid.  Increase in shortness of breath    Last in hospital lasix stopped.    Triaged for breathing difficulty.  Stated he frequently has shortness of breath but is worse.  Per the protocol, I instructed he be seen in an ER.  Caller stated understanding and agreement.  Stated he'll go to [facility]      Reason for Disposition    MODERATE difficulty breathing (e.g., speaks in phrases, SOB even at rest, pulse 100-120) of new-onset or worse than normal    Additional Information    Negative: SEVERE difficulty breathing (e.g., struggling for each breath, speaks in single words, pulse > 120)    Negative: Breathing stopped and hasn't returned    Negative: Choking on something    Negative: Bluish (or gray) lips or face    Negative: Difficult to awaken or acting confused (e.g., disoriented, slurred speech)    Negative: Passed out (i.e., fainted, collapsed and was not responding)    Negative: Wheezing started suddenly after medicine, an allergic food, or bee sting    Negative: Stridor    Negative: Slow, shallow and weak breathing    Negative: Sounds like a life-threatening emergency to the triager    Protocols used: BREATHING DIFFICULTY-A-OH    Roula VU RN [facility]

## 2023-06-28 NOTE — TELEPHONE ENCOUNTER
June 28, 2023    Home health orders was received via fax for Dr. Montero to sign.  Patient label was attached to paperwork and placed in provider's inbox to be signed.    Kylee Torres

## 2023-06-29 NOTE — TELEPHONE ENCOUNTER
June 29, 2023    Home health orders was picked up from outbox of Dr. Montero.  Paperwork has been reviewed and is complete.  Per initial initial request, this was sent via fax to [phone]    Kylee Torres

## 2023-07-06 NOTE — PROGRESS NOTES
"Rich is a 68 year old who is being evaluated via a billable telephone visit.      What phone number would you like to be contacted at? [phone]  Distant Location (provider location):  On-site    Assessment & Plan     Essential hypertension  Ongoing treatment     Chronic diastolic heart failure (H)  Ongoing treatment    Depression, unspecified depression type  Multifactorial - chronic alcohol, personal and family issues.  He denies suicidal ideation - \"I'm not going to commit suicide\".  Is on escitalopram with trazodone currently.      Chronic anticoagulation  Ongoing use of apixiban.    Chronic alcoholism  He says he is using alcohol in evenings now, I strongly urged that he stop.  He says he was OK in the hospital off alcohol (he did have withdrawal), and says he will stop it now.      Follow up in 3-4 weeks.     Depression Screening Follow Up        7/6/2023    11:58 AM   PHQ   PHQ-9 Total Score 8   Q9: Thoughts of better off dead/self-harm past 2 weeks Nearly every day   F/U: Thoughts of suicide or self-harm Yes   F/U: Self harm-plan No   F/U: Self-harm action No   F/U: Safety concerns Yes     Mason Montero MD  [facility]    Subjective   Rich is a 68 year old, presenting for the following health issues:  Recheck Medication and Hospital F/U (Pt is here for follow up from [facility] )  Much stress with his daughter living with him, has heroin and metamphetamine use disorder, and very difficult.  He reports using alcohol every evening.  Feels 'down', but volunteers that he has no suicidal intent or plan.  Does take trazodone with escitalopram.  Sleeps OK.  No unusual dyspnea or cough.  He feels his urinary function is OK - he has a catheter in place and will see urology in one week.         7/6/2023    12:01 PM   Additional Questions   Roomed by amaury   Accompanied by no     Hospital Follow-up Visit:    Hospital/Nursing Home/IP Rehab Facility: [facility]   Date " of Admission: 6/23/23  Date of Discharge: 6/28/23  Reason(s) for Admission: sepsis    Was your hospitalization related to COVID-19? No   Problems taking medications regularly:  None  Medication changes since discharge: none  Problems adhering to non-medication therapy:  Does use     Summary of hospitalization:  [facility] discharge summary reviewed  Other Healthcare Providers Involved in Patient s Care:         Homecare  Update since discharge: stable.    Plan of care communicated with patient     Pt expressed concerns about safety if daughter come back home. Drinks boost, will be starting meals on wheels.    Review of Systems   See HPI    Objective    Alert and oriented in NAD    Phone call duration: 24 minutes

## 2023-07-10 NOTE — TELEPHONE ENCOUNTER
July 10, 2023    Home health orders was received via fax for Dr. Montero to sign.  Patient label was attached to paperwork and placed in provider's inbox to be signed.    Kylee Torres

## 2023-07-11 NOTE — TELEPHONE ENCOUNTER
Pt calling re: urine color.    Background:  Has a catheter in from recent hospitalization.    Assessment:  Urine has looked reddish in the drainage bag since yesterday. Says it looks clear coming out of the tube from his penis. Denies any pain. Says urine is draining fine.    Disposition:  Protocol recommends home care.    Reviewed care advice. Advised pt to call back with any new or worsening symptoms. Patient verbalized understanding.    Charis Berkowitz RN, BSN  [facility]  Triage Nurse Advisor    Reason for Disposition    Tea-colored or slightly red urine, present < 24 hours    Additional Information    Negative: Shock suspected (e.g., cold/pale/clammy skin, too weak to stand, low BP, rapid pulse)    Negative: Sounds like a life-threatening emergency to the triager    Negative: Catheter was accidentally pulled-out and bright red continuous bleeding    Negative: SEVERE abdominal pain    Negative: Fever > 100.4 F (38.0 C)    Negative: Drinking very little and dehydration suspected (e.g., no urine > 12 hours, very dry mouth, very lightheaded)    Negative: Patient sounds very sick or weak to the triager    Negative: Catheter was accidentally pulled-out    Negative: Bleeding around catheter (e.g., from penis or female urethra)    Negative: Lower abdominal pain or distention    Negative: Tea-colored or slightly red urine lasts > 24 hours that is not cleared by increasing fluid intake, and no recent prostate or bladder surgery    Negative: Cloudy urine lasts > 24 hours and not cleared by increasing fluid intake    Negative: Bloody or red-colored urine and no recent prostate or bladder surgery  (Exception: Brief episode and urine now clear.)    Negative: Bloody or red-colored urine and prostate or bladder surgery > 3 days (72 hours) ago    Negative: No urine in bag for > 4 hours and catheter is not kinked    Negative: Urine smells bad    Negative: Leakage of urine around catheter    Negative: Catheter is broken  or cracked and is still usable    Negative: Patient wants to be seen    Negative: Urinary catheter care, questions about    Protocols used: URINARY CATHETER (E.G., STEWART) SYMPTOMS AND [id number]

## 2023-07-13 NOTE — TELEPHONE ENCOUNTER
July 13, 2023    Home health orders was received via fax for Dr. Montero to sign.  Patient label was attached to paperwork and placed in provider's inbox to be signed.    Kylee Torres

## 2023-07-14 NOTE — TELEPHONE ENCOUNTER
"Outpatient Medication Detail     Disp Refills Start End BÁRBARA   potassium chloride ER (KLOR-CON M) 20 MEQ CR tablet   3/16/2023 3/15/2024 --   Sig - Route: Take 20 mEq by mouth 2 times daily - Oral   Class: Historical   Order: [id number]     Outpatient Medication Detail     Disp Refills Start End BÁRBARA   rosuvastatin (CRESTOR) 20 MG tablet   3/16/2023 3/15/2024 --   Sig - Route: Take 20 mg by mouth daily - Oral   Class: Historical   Order: [id number]     Routing refill request to provider for review/approval because:  Medication is reported/historical  Labs not current:  Multiple    Last office visit provider:  7/6/23     Requested Prescriptions   Pending Prescriptions Disp Refills     potassium chloride ER (KLOR-CON M) 20 MEQ CR tablet       Sig: Take 1 tablet (20 mEq) by mouth 2 times daily       Potassium Supplements Protocol Failed - 7/14/2023 12:21 PM        Failed - Normal serum potassium in past 12 months     Recent Labs   Lab Test 05/01/18  1241   POTASSIUM 4.5                    Passed - Recent (12 mo) or future (30 days) visit within the authorizing provider's department     Patient has had an office visit with the authorizing provider or a provider within the authorizing providers department within the previous 12 mos or has a future within next 30 days. See \"Patient Info\" tab in inbasket, or \"Choose Columns\" in Meds & Orders section of the refill encounter.              Passed - Medication is active on med list        Passed - Patient is age 18 or older           rosuvastatin (CRESTOR) 20 MG tablet       Sig: Take 1 tablet (20 mg) by mouth daily       Statins Protocol Failed - 7/14/2023 12:21 PM        Failed - LDL on file in past 12 months     No lab results found.          Passed - No abnormal creatine kinase in past 12 months     No lab results found.             Passed - Recent (12 mo) or future (30 days) visit within the authorizing provider's specialty     Patient has had an office visit with the " "authorizing provider or a provider within the authorizing providers department within the previous 12 mos or has a future within next 30 days. See \"Patient Info\" tab in inbasket, or \"Choose Columns\" in Meds & Orders section of the refill encounter.              Passed - Medication is active on med list        Passed - Patient is age 18 or older             Jose Bacon RN 07/14/23 2:36 PM  "

## 2023-07-19 NOTE — TELEPHONE ENCOUNTER
Kortney reports that patient is taking the following prescription seen in 06/23 Admission encounter [facility]:      metoprolol tartrate (LOPRESSOR) 100 MG tablet    Indications: Atrial Fibrillation Take 2 Tablets (200 mg) by mouth two times a day. Indications: Atrial Fibrillation 360 Tablet   3 06/06/2023 06/05/2024 Active     Kortney states they were told by the home care nurse that patient should not take medication if BP systolic is below 100 and that is the guidelines they are using.     Kortney not able to relay BP at time of call but states that this dose of medication is working well.         Active medication on medication list with [facility]:    metoprolol tartrate (LOPRESSOR) 50 MG tablet 90 tablet 3 5/6/2023  No   Sig - Route: Take 1 tablet (50 mg) by mouth 2 times daily - Oral   Sent to pharmacy as: Metoprolol Tartrate 50 MG Oral Tablet (LOPRESSOR)

## 2023-07-19 NOTE — TELEPHONE ENCOUNTER
Patient's daughter Kortney called and stated that Dr. Montero told the patient to take 200mg twice daily, and requesting a new prescription.

## 2023-07-19 NOTE — TELEPHONE ENCOUNTER
July 19, 2023    Home health orders was picked up from outbox of Dr. Montero.  Paperwork has been reviewed and is complete.  Per initial initial request, this was sent via fax to [phone].     Eboni De La Rosa

## 2023-07-19 NOTE — TELEPHONE ENCOUNTER
July 19, 2023    Home health orders was picked up from outbox of Dr. Montero.  Paperwork has been reviewed and is complete.  Per initial initial request, this was sent via fax to [phone].     Eboni De La Rosa

## 2023-07-21 NOTE — TELEPHONE ENCOUNTER
Bill calling concerned he is unable to get his Metoprolol from the pharmacy, it was ordered yesterday and receipt confirmed by the [facility] pharmacy yesterday. Pt is out of medication.   Placed call to pharmacy and med will be ready in an hour.    Sara Solorio RN on 7/20/2023 at 7:43 PM      Reason for Disposition    [1] Prescription prescribed recently is not at pharmacy AND [2] triager has access to patient's EMR AND [3] prescription is recorded in the EMR    Protocols used: MEDICATION QUESTION CALL-A-AH

## 2023-07-27 NOTE — TELEPHONE ENCOUNTER
July 27, 2023    Home health orders was received via fax for Dr. Montero to sign.  Patient label was attached to paperwork and placed in provider's inbox to be signed.    Kylee Torres

## 2023-07-28 NOTE — TELEPHONE ENCOUNTER
July 28, 2023    Home health orders was picked up from outbox of Dr. Montero.  Paperwork has been reviewed and is complete.  Per initial initial request, this was sent via fax to [phone].     Kylee Torres

## 2023-08-03 NOTE — TELEPHONE ENCOUNTER
August 3, 2023    Home health orders was received via fax for Dr. Montero to sign.  Patient label was attached to paperwork and placed in provider's inbox to be signed.    Kylee Torres

## 2023-08-08 NOTE — TELEPHONE ENCOUNTER
August 8, 2023    Home health orders was picked up from outbox of Dr. Montero.  Paperwork has been reviewed and is complete.  Per initial initial request, this was sent via fax to [phone].     Kylee Torres

## 2023-08-23 NOTE — TELEPHONE ENCOUNTER
August 23, 2023    Home health orders was received via fax for Dr. Montero to sign.  Patient label was attached to paperwork and placed in provider's inbox to be signed.    Sameera Galavn

## 2023-08-24 NOTE — TELEPHONE ENCOUNTER
August 24, 2023    Home health orders was picked up from outbox of Dr. Montero.  Paperwork has been reviewed and is complete.  Per initial initial request, this was sent via fax to [phone].     Sameera Galvan

## 2023-08-28 NOTE — TELEPHONE ENCOUNTER
General Call      Reason for Call:  [facility] calling with an update regarding:  BP 84/62  Not having symptoms  Pt stating runs low like this at least once weekly    Folic Acid med - 400mcg at home    What are your questions or concerns:  n/a    Date of last appointment with provider: n/a    Could we send this information to you in [employer] or would you prefer to receive a phone call?:   No preference   Okay to leave a detailed message?: Yes at Other phone number:  [phone]

## 2023-08-30 NOTE — TELEPHONE ENCOUNTER
Attempted to contact Andreea from [employer] regarding status update. No answer, left message to return call to clinic.    When Andreea returns call please gather more information regarding the following - do they have more information on patient BP? HR? Was there anything needed with the medication reported: folic acid 400 mcg?

## 2023-08-31 NOTE — TELEPHONE ENCOUNTER
MTM referral from: Patient's insurance     MTM referral outreach attempt #1 on August 31, 2023      Outcome: declines MTM services, requests to opt out for this year     Keerthi CarverD   Medication Therapy Management

## 2023-09-01 NOTE — TELEPHONE ENCOUNTER
Patient Returning Call    Reason for call:  return call    Information relayed to patient:  no, please call nurse Andreea to discuss. Phone number listed below    Patient has additional questions:  No

## 2023-09-01 NOTE — TELEPHONE ENCOUNTER
Spoke to Andreea who stated she will call clinic back at a later time due to her seeing another patient at time of call.     Gave Andreea clinic phone number, relayed list of questions needing response below.

## 2023-09-01 NOTE — TELEPHONE ENCOUNTER
"Spoke to Andreea regarding low BP reading from 8/28/23. Per Andreea BP was checked multiple times using Andreea's equipment, pt home BP monitoring equipment. Per Andreea HR at time was 49. Pt denied dizziness, lightheadedness, stated that BP would run low \"on occasion.\"    Regarding folic acid, Andreea stated pt has 400mcg tabs at home, has been taking one per day. Both clinic med list and med list Andreea has state folic acid 1mg daily.    "

## 2023-09-05 NOTE — TELEPHONE ENCOUNTER
September 5, 2023    Home health orders was received via fax for Dr. Montero to sign.  Patient label was attached to paperwork and placed in provider's inbox to be signed.    Pam J. Behr

## 2023-09-06 NOTE — TELEPHONE ENCOUNTER
Spoke to Andreea regarding provider message from 9/2/23, listed as part of 8/28/23 phone encounter.     Andreea stated that she had not reviewed her voice mails from weekend. Relayed provider message, Andreea verbalized understanding of change in folic acid, will call provider at phone number left in voice mail with further questions.    Andreea had no further questions at this time.

## 2023-09-06 NOTE — TELEPHONE ENCOUNTER
September 6, 2023    Home health orders was picked up from outbox of Dr. Montero.  Paperwork has been reviewed and is complete.  Per initial initial request, this was sent via fax to [phone].     Pam J. Behr

## 2023-09-08 NOTE — TELEPHONE ENCOUNTER
September 8, 2023    Home health orders was picked up from outbox of Dr. Montero.  Paperwork has been reviewed and is complete.  Per initial initial request, this was sent via fax to [phone].     Pam J. Behr

## 2023-09-08 NOTE — TELEPHONE ENCOUNTER
September 8, 2023    Home health orders was received via fax for Dr. Montero to sign.  Patient label was attached to paperwork and placed in provider's inbox to be signed.    Pam J. Behr

## 2023-09-11 NOTE — TELEPHONE ENCOUNTER
September 11, 2023    Home health orders was received via fax for Dr. Montero to sign.  Patient label was attached to paperwork and placed in provider's inbox to be signed.    Pam J. Behr

## 2023-09-12 NOTE — TELEPHONE ENCOUNTER
September 12, 2023    Home health orders was picked up from outbox of Dr. Montero.  Paperwork has been reviewed and is complete.  Per initial initial request, this was sent via fax to [phone].     Sameera Galvan

## 2023-09-13 NOTE — TELEPHONE ENCOUNTER
September 13, 2023    Home health orders was received via fax for Dr. Montero to sign.  Patient label was attached to paperwork and placed in provider's inbox to be signed.    Sameera Galvan

## 2023-09-14 NOTE — TELEPHONE ENCOUNTER
September 14, 2023    Home health orders was picked up from outbox of Dr. Montero.  Paperwork has been reviewed and is complete.  Per initial initial request, this was sent via fax to [phone].     Sameera Galvan

## 2023-10-10 ENCOUNTER — TELEPHONE (OUTPATIENT)
Dept: INTERNAL MEDICINE | Facility: CLINIC | Age: 68
End: 2023-10-10
Payer: COMMERCIAL

## 2023-10-10 ENCOUNTER — TELEPHONE (OUTPATIENT)
Dept: INTERNAL MEDICINE | Facility: CLINIC | Age: 68
End: 2023-10-10

## 2023-10-10 NOTE — TELEPHONE ENCOUNTER
FYI - Status Update    Who is Calling: Daughter is calling to say her father passed away 10/7/23    Update: Just FYI    Does caller want a call/response back: Yes     Could we send this information to you in [employer] or would you prefer to receive a phone call?:   Patient would prefer a phone call   Okay to leave a detailed message?: Yes at Other phone number:  [phone]

## 2023-10-16 ENCOUNTER — TELEPHONE (OUTPATIENT)
Dept: INTERNAL MEDICINE | Facility: CLINIC | Age: 68
End: 2023-10-16
Payer: COMMERCIAL

## 2023-10-16 NOTE — TELEPHONE ENCOUNTER
October 16, 2023    Home health orders was received via fax for Dr. Montero to sign.  Patient label was attached to paperwork and placed in provider's inbox to be signed.    Sameera Galvan

## 2023-10-19 NOTE — TELEPHONE ENCOUNTER
October 19, 2023    Home health orders was picked up from outbox of Dr. Montero.  Paperwork has been reviewed and is complete.  Per initial initial request, this was sent via fax to [phone].     Sameera Galvan

## 2023-10-30 ENCOUNTER — MEDICAL CORRESPONDENCE (OUTPATIENT)
Dept: HEALTH INFORMATION MANAGEMENT | Facility: CLINIC | Age: 68
End: 2023-10-30

## 2023-12-24 NOTE — TELEPHONE ENCOUNTER
Order/Referral Request    Who is requesting: [facility]    Orders being requested:   Delays resumption of care starting 03/22/2023    Reason service is needed/diagnosis: n/a    When are orders needed by: n/a    Has this been discussed with Provider: No    Does patient have a preference on a Group/Provider/Facility? n/a    Does patient have an appointment scheduled?: No    Where to send orders: N/A    Okay to leave a detailed message?: Yes at Other phone number:  [phone]  
The Home Care/Assisted Living/Nursing Facility is calling regarding an established patient.  Has the patient seen Home Care in the past or is currently residing in Assisted Living or Nursing Facility? Yes.     Talita calling from [facility] requesting the following orders that are within the Home Care, Assisted Living or Nursing Home Eval and Treatment standing order and can be signed as standing order signature required by RN.    Preferred Call Back Number: [phone]        Any additional Orders:  Are there any orders requested, not stated above, that are outside of the standing order and must be routed to a licensed practitioner for approval?    No    Writer has verified Requestor will send fax to have orders signed.    Verbal orders to delay care until 3/22/23 given  
Yes

## 2024-07-26 NOTE — TELEPHONE ENCOUNTER
"Routing refill request to provider for review/approval because:  Patient needs to be seen because it has been 1 year since last office visit.  No blood pressure on file in the last year.     Disp Refills Start End BÁRBARA   metoprolol tartrate (LOPRESSOR) 50 MG tablet 180 tablet 3 1/11/2021  No   Sig - Route: Take 1 tablet (50 mg total) by mouth 2 (two) times a day. - Oral   Sent to pharmacy as: metoprolol tartrate 50 mg tablet (LOPRESSOR)   E-Prescribing Status: Receipt confirmed by pharmacy (1/11/2021 11:12 AM CST)     Last Written Prescription Date:  01/11/2021  Last Fill Quantity: 180,  # refills: 3   Last office visit provider:  01/13/2021 with Dr Montero.    Requested Prescriptions   Pending Prescriptions Disp Refills     metoprolol tartrate (LOPRESSOR) 50 MG tablet       Sig: Take 1 tablet (50 mg) by mouth 2 times daily       Beta-Blockers Protocol Failed - 12/23/2021  2:33 PM        Failed - Blood pressure under 140/90 in past 12 months     BP Readings from Last 3 Encounters:   06/03/12 160/80                 Failed - Medication is active on med list        Passed - Patient is age 6 or older        Passed - Recent (12 mo) or future (30 days) visit within the authorizing provider's specialty     Patient has had an office visit with the authorizing provider or a provider within the authorizing providers department within the previous 12 mos or has a future within next 30 days. See \"Patient Info\" tab in inbasket, or \"Choose Columns\" in Meds & Orders section of the refill encounter.                   Deysi Bella 12/27/21 12:13 AM  "
Refill Request  Medication name: Pending Prescriptions:                       Disp   Refills    metoprolol tartrate (LOPRESSOR) 50 MG tab*                    Sig: Take 1 tablet (50 mg) by mouth 2 times daily    Who prescribed the medication: unknown not on med list at all  Last refill on medication: unknown  [facility]ed [facility]  Last appointment with PCP: 1/13/21  Next appointment: Patient due for appointment        
suicidality